# Patient Record
Sex: FEMALE | Race: WHITE | Employment: FULL TIME | URBAN - METROPOLITAN AREA
[De-identification: names, ages, dates, MRNs, and addresses within clinical notes are randomized per-mention and may not be internally consistent; named-entity substitution may affect disease eponyms.]

---

## 2017-03-13 NOTE — PROGRESS NOTES
HPI:    Patient ID: Edgardo Wiseman is a 39year old female. Patient presents with: Follow - Up: anxiety    HPI   Here for anxiety sx. Started zoloft 4 weeks and noticing improvement. Was seen in 8/2016 but did not start med due to breastfeeding.   Daug N, DO;  Location:  [facility]      Family History   Problem Relation Age of Onset   • Cancer Father      skin cancer   • Heart Disorder Father 58   • Hypertension Father    • Diabetes Neg    • Psychiatric Sister    • Psychiatric Brother       Social History:

## 2017-08-26 NOTE — PROGRESS NOTES
HPI:    Patient ID: Bryan Blanco is a 39year old female. Patient presents with:  Abdominal Pain    HPI  Left lower abdominal pain  Onset: 4-5 months. Intermittent. Freq: 1-2 times a week. Sharp pains. Resolves on own after few minutes.   Non-radiatin Disorder Father 58   • Hypertension Father    • Psychiatric Sister    • Psychiatric Brother    • Diabetes Neg       Social History: Smoking status: Never Smoker                                                              Alcohol use:  No                 PH

## 2017-10-04 ENCOUNTER — TELEPHONE (OUTPATIENT)
Dept: FAMILY MEDICINE CLINIC | Facility: CLINIC | Age: 37
End: 2017-10-04

## 2017-10-04 RX ORDER — ALPRAZOLAM 0.25 MG/1
0.25 TABLET ORAL DAILY PRN
Qty: 10 TABLET | Refills: 0 | OUTPATIENT
Start: 2017-10-04 | End: 2018-12-21
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
## 2017-10-04 NOTE — TELEPHONE ENCOUNTER
Pt states she has been on sertraline for about 4-7 months. She says it was working well and she was not getting panic attacks often but recently she has been working 12-13 hour days and her work environment has been more stressful.  She states attacks last

## 2017-10-04 NOTE — TELEPHONE ENCOUNTER
Increased anxiety with new job past few weeks. Plan: trial of xanax prn. Cont zoloft. Side effects and therapeutic benefits of medication reviewed. Can't take it before driving or if needs to be alert.  Pt agrees and expresses understanding  Refer to Gail Oconnor

## 2017-10-06 ENCOUNTER — TELEPHONE (OUTPATIENT)
Dept: FAMILY MEDICINE CLINIC | Facility: CLINIC | Age: 37
End: 2017-10-06

## 2017-10-06 NOTE — TELEPHONE ENCOUNTER
Pt called stated she has questions regarding her anxiety she stated that Dr Shante Mohr prescribed her Xanax and she just does not feel comfortable taking it especially when she is at work. So she is not quite sure what to do.

## 2017-10-06 NOTE — TELEPHONE ENCOUNTER
Spoke with pt. She is feeling better now. Her new job that she started approx 4 weeks ago is causing panic attacks. Feels overwhelmed when there. Offered to increase zoloft dose but she would like to hold of on that for now.   Plan:   Xanax prn  Will be

## 2017-10-06 NOTE — TELEPHONE ENCOUNTER
Pt has concerns taking the medication (xanax)  Pt has never taken this medication before. Patient is concerned this medication is addicting. Patient is worried about the side effects - taking it at work. Patient has been having panic attacks at work.   P

## 2017-12-28 ENCOUNTER — TELEPHONE (OUTPATIENT)
Dept: FAMILY MEDICINE CLINIC | Facility: CLINIC | Age: 37
End: 2017-12-28

## 2017-12-28 NOTE — TELEPHONE ENCOUNTER
Pt called stated she has an infection in her jaw bone from her wisdom teeth coming out and her dentist does not really want to do anything for this except for a root canal. Pt stated she can't get into any where else until next week.  She was wondering if D

## 2017-12-28 NOTE — TELEPHONE ENCOUNTER
DEYVI Muñoz. Patient states she has infection from an old root canal.  Patient saw her dentist and he told her she has an infection in her jaw bone.   Patient states the dentist told her he is not prescribing antibiotics at this time  Patient is seeing an

## 2017-12-30 ENCOUNTER — OFFICE VISIT (OUTPATIENT)
Dept: FAMILY MEDICINE CLINIC | Facility: CLINIC | Age: 37
End: 2017-12-30

## 2017-12-30 VITALS
DIASTOLIC BLOOD PRESSURE: 58 MMHG | HEART RATE: 87 BPM | SYSTOLIC BLOOD PRESSURE: 98 MMHG | TEMPERATURE: 99 F | RESPIRATION RATE: 20 BRPM | OXYGEN SATURATION: 98 %

## 2017-12-30 DIAGNOSIS — J02.0 STREP THROAT: Primary | ICD-10-CM

## 2017-12-30 PROCEDURE — 87880 STREP A ASSAY W/OPTIC: CPT | Performed by: NURSE PRACTITIONER

## 2017-12-30 PROCEDURE — 99213 OFFICE O/P EST LOW 20 MIN: CPT | Performed by: NURSE PRACTITIONER

## 2017-12-30 RX ORDER — AMOXICILLIN 875 MG/1
875 TABLET, COATED ORAL 2 TIMES DAILY
Qty: 20 TABLET | Refills: 0 | Status: SHIPPED | OUTPATIENT
Start: 2017-12-30 | End: 2018-01-09

## 2017-12-30 NOTE — PROGRESS NOTES
CHIEF COMPLAINT:   Patient presents with:  Sore Throat      HPI:   Uriah Ricks is a 40year old female presents to clinic with symptoms of sore throat. Patient has had for 3 days. Symptoms have been constant since onset.   Patient reports following asso LUNGS: clear to auscultation bilaterally, no wheezes or rhonchi. Breathing is non labored.   CARDIO: RRR without murmur  GI: good BS's,no masses, hepatosplenomegaly, or tenderness on direct palpation  EXTREMITIES: no cyanosis, clubbing or edema  LYMPH: no a · No work or school for the first 2 days of taking the antibiotics. After this time, you will not be contagious. You can then return to school or work if you are feeling better.   · Take antibiotic medicine for the full 10 days, even if you feel better.  Sera Buchanan © 4178-1521 [employer] [address] [facility], [facility] [place]. All rights reserved. This information is not intended as a substitute for professional medical care. Always follow your healthcare professional's instructions.               Madeleine Horne

## 2018-03-13 ENCOUNTER — OFFICE VISIT (OUTPATIENT)
Dept: FAMILY MEDICINE CLINIC | Facility: CLINIC | Age: 38
End: 2018-03-13

## 2018-03-13 VITALS
SYSTOLIC BLOOD PRESSURE: 116 MMHG | HEIGHT: 67 IN | TEMPERATURE: 98 F | RESPIRATION RATE: 18 BRPM | DIASTOLIC BLOOD PRESSURE: 78 MMHG | BODY MASS INDEX: 25.43 KG/M2 | OXYGEN SATURATION: 98 % | HEART RATE: 71 BPM | WEIGHT: 162 LBS

## 2018-03-13 DIAGNOSIS — I88.9 LYMPHADENITIS: Primary | ICD-10-CM

## 2018-03-13 DIAGNOSIS — J02.9 SORE THROAT: ICD-10-CM

## 2018-03-13 LAB — CONTROL LINE PRESENT WITH A CLEAR BACKGROUND (YES/NO): YES YES/NO

## 2018-03-13 PROCEDURE — 99213 OFFICE O/P EST LOW 20 MIN: CPT | Performed by: PHYSICIAN ASSISTANT

## 2018-03-13 PROCEDURE — 87880 STREP A ASSAY W/OPTIC: CPT | Performed by: PHYSICIAN ASSISTANT

## 2018-03-13 RX ORDER — CEPHALEXIN 500 MG/1
500 CAPSULE ORAL 3 TIMES DAILY
Qty: 21 CAPSULE | Refills: 0 | Status: SHIPPED | OUTPATIENT
Start: 2018-03-13 | End: 2018-03-20

## 2018-03-14 NOTE — PROGRESS NOTES
CHIEF COMPLAINT:   Patient presents with:  Sore Throat: only on left side x 4 days        HPI:   Ana Urena is a 40year old female presents to clinic with complaint of sore throat. Patient has had 4 days. Symptoms have been stable since onset.   Uri Turk /78 (BP Location: Left arm, Patient Position: Sitting, Cuff Size: adult)   Pulse 71   Temp 98.1 °F (36.7 °C) (Oral)   Resp 18   Ht 67\"   Wt 162 lb   LMP 02/28/2018   SpO2 98%   BMI 25.37 kg/m²   GENERAL: well developed, well nourished,in no apparent Follow up with PCP in 3-5 days if not improving, condition worsens, or fever greater than or equal to 100.4 persists for 72 hours. The patient/parent indicates understanding of these issues and agrees to the plan. Patient Instructions     1.   Keflex · You may use acetaminophen or ibuprofen to control pain and fever, unless another medicine was prescribed for this. Don’t use ibuprofen in children under 10months of age.  If you have chronic liver or kidney disease, talk with your healthcare provider befo · Ask your child’s healthcare provider how you should take the temperature.   · Rectal or forehead (temporal artery) temperature of 100.4°F (38°C) or higher, or as directed by the provider  · Armpit temperature of 99°F (37.2°C) or higher, or as directed by

## 2018-03-14 NOTE — PATIENT INSTRUCTIONS
1.  Keflex 500 mg three times daily for 7 days. 2.  Soft diet, ibuprofen as directed for pain/inflammation. Local Lymph Node Infection, Antibiotic Treatment    You have a bacterial infection of a lymph node.  The lymph nodes are part of your immune · You may use acetaminophen or ibuprofen to control pain and fever, unless another medicine was prescribed for this. Don’t use ibuprofen in children under 10months of age.  If you have chronic liver or kidney disease, talk with your healthcare provider befo · Ask your child’s healthcare provider how you should take the temperature.   · Rectal or forehead (temporal artery) temperature of 100.4°F (38°C) or higher, or as directed by the provider  · Armpit temperature of 99°F (37.2°C) or higher, or as directed by

## 2018-04-04 NOTE — TELEPHONE ENCOUNTER
Sertraline HCl 50 MG Oral Tab 90 tablet 1 8/26/2017     Last labs 08/26/16. Last seen on 08/26/17. CC: abd pain   BP 90/62. No future appointments. Thank you.

## 2018-05-16 ENCOUNTER — TELEPHONE (OUTPATIENT)
Dept: FAMILY MEDICINE CLINIC | Facility: CLINIC | Age: 38
End: 2018-05-16

## 2018-05-16 DIAGNOSIS — C44.91 SKIN CANCER, BASAL CELL: Primary | ICD-10-CM

## 2018-05-16 NOTE — TELEPHONE ENCOUNTER
LMTCB    Notify pt. Referral has been place.   Joselyn Goodell MD       Address: [address] [facility] [id number]     Phone: [phone]

## 2018-05-16 NOTE — TELEPHONE ENCOUNTER
Please advise if she needs a referral to see dermatologist or she needs an apt to see you? Please advise.

## 2018-05-16 NOTE — TELEPHONE ENCOUNTER
Pt called stated she has a history of basal cells and was wondering if she could get a referral to a Dermatology.  She stated she has new spots and was wondering if  wants to see her first.

## 2018-07-03 NOTE — TELEPHONE ENCOUNTER
LOV: 8/26/17 anxiety    SERTRALINE HCL 50 MG Oral Tab 90 tablet 0 4/4/2018    Sig :  TAKE 1 TABLET(50 MG) BY MOUTH DAILY     Route:   (none)       No future appointments. Please advise.

## 2018-07-08 ENCOUNTER — HOSPITAL ENCOUNTER (OUTPATIENT)
Age: 38
Discharge: HOME | End: 2018-07-08
Attending: EMERGENCY MEDICINE
Payer: COMMERCIAL

## 2018-07-08 ENCOUNTER — APPOINTMENT (OUTPATIENT)
Dept: CT IMAGING | Age: 38
End: 2018-07-08
Attending: EMERGENCY MEDICINE
Payer: COMMERCIAL

## 2018-07-08 VITALS
DIASTOLIC BLOOD PRESSURE: 82 MMHG | HEIGHT: 67 IN | WEIGHT: 170 LBS | HEART RATE: 80 BPM | RESPIRATION RATE: 16 BRPM | SYSTOLIC BLOOD PRESSURE: 119 MMHG | TEMPERATURE: 99 F | BODY MASS INDEX: 26.68 KG/M2 | OXYGEN SATURATION: 98 %

## 2018-07-08 DIAGNOSIS — N83.201 CYST OF RIGHT OVARY: Primary | ICD-10-CM

## 2018-07-08 DIAGNOSIS — K59.00 CONSTIPATION, UNSPECIFIED CONSTIPATION TYPE: ICD-10-CM

## 2018-07-08 LAB
#LYMPHOCYTE IC: 2.4 X10ˆ3/UL (ref 0.9–3.2)
#MXD IC: 0.5 X10ˆ3/UL (ref 0.1–1)
#NEUTROPHIL IC: 3.7 X10ˆ3/UL (ref 1.3–6.7)
ALBUMIN SERPL-MCNC: 3.8 G/DL (ref 3.5–4.8)
ALP LIVER SERPL-CCNC: 47 U/L (ref 37–98)
ALT SERPL-CCNC: 24 U/L (ref 14–54)
AST SERPL-CCNC: 11 U/L (ref 15–41)
BILIRUB SERPL-MCNC: 0.3 MG/DL (ref 0.1–2)
BUN BLD-MCNC: 18 MG/DL (ref 8–20)
CALCIUM BLD-MCNC: 9 MG/DL (ref 8.3–10.3)
CHLORIDE: 107 MMOL/L (ref 101–111)
CO2: 25 MMOL/L (ref 22–32)
CREAT BLD-MCNC: 0.84 MG/DL (ref 0.55–1.02)
CREAT SERPL-MCNC: 0.8 MG/DL (ref 0.55–1.02)
GLUCOSE BLD-MCNC: 96 MG/DL (ref 70–99)
GLUCOSE BLD-MCNC: 99 MG/DL (ref 70–99)
HCT IC: 35.1 % (ref 37–54)
HGB IC: 11.6 G/DL (ref 12–16)
ISTAT BLOOD GAS TCO2: 25 MMOL/L (ref 22–32)
ISTAT BUN: 18 MG/DL (ref 8–20)
ISTAT CHLORIDE: 104 MMOL/L (ref 101–111)
ISTAT HEMATOCRIT: 35 % (ref 34–50)
ISTAT IONIZED CALCIUM: 1.3 MMOL/L (ref 1.12–1.32)
ISTAT POTASSIUM: 4.1 MMOL/L (ref 3.6–5.1)
ISTAT SODIUM: 140 MMOL/L (ref 136–144)
LYMPHOCYTES NFR BLD AUTO: 35.9 %
M PROTEIN MFR SERPL ELPH: 7.5 G/DL (ref 6.1–8.3)
MCH IC: 29.4 PG (ref 27–33.2)
MCHC IC: 33 G/DL (ref 31–37)
MCV IC: 88.9 FL (ref 81–100)
MIXED CELL %: 7.6 %
NEUTROPHILS NFR BLD AUTO: 56.5 %
PLT IC: 319 X10ˆ3/UL (ref 150–450)
POCT BILIRUBIN URINE: NEGATIVE
POCT BLOOD URINE: NEGATIVE
POCT GLUCOSE URINE: NEGATIVE MG/DL
POCT LEUKOCYTE ESTERASE URINE: NEGATIVE
POCT NITRITE URINE: NEGATIVE
POCT PH URINE: 6 (ref 5–8)
POCT SPECIFIC GRAVITY URINE: 1.03
POCT URINE CLARITY: CLEAR
POCT URINE COLOR: YELLOW
POCT URINE PREGNANCY: NEGATIVE
POCT UROBILINOGEN URINE: 0.2 MG/DL
POTASSIUM SERPL-SCNC: 4.1 MMOL/L (ref 3.6–5.1)
RBC IC: 3.95 X10ˆ6/UL (ref 3.8–5.1)
SODIUM SERPL-SCNC: 141 MMOL/L (ref 136–144)
WBC IC: 6.6 X10ˆ3/UL (ref 4–13)

## 2018-07-08 PROCEDURE — 81002 URINALYSIS NONAUTO W/O SCOPE: CPT | Performed by: EMERGENCY MEDICINE

## 2018-07-08 PROCEDURE — 99204 OFFICE O/P NEW MOD 45 MIN: CPT

## 2018-07-08 PROCEDURE — 81025 URINE PREGNANCY TEST: CPT | Performed by: EMERGENCY MEDICINE

## 2018-07-08 PROCEDURE — 80047 BASIC METABLC PNL IONIZED CA: CPT

## 2018-07-08 PROCEDURE — 74177 CT ABD & PELVIS W/CONTRAST: CPT | Performed by: EMERGENCY MEDICINE

## 2018-07-08 PROCEDURE — 99214 OFFICE O/P EST MOD 30 MIN: CPT

## 2018-07-08 PROCEDURE — 36415 COLL VENOUS BLD VENIPUNCTURE: CPT

## 2018-07-08 PROCEDURE — 80053 COMPREHEN METABOLIC PANEL: CPT | Performed by: EMERGENCY MEDICINE

## 2018-07-08 PROCEDURE — 85025 COMPLETE CBC W/AUTO DIFF WBC: CPT | Performed by: EMERGENCY MEDICINE

## 2018-07-08 NOTE — ED PROVIDER NOTES
Patient presents with:  Abdominal Pain  Back Pain (musculoskeletal)    HPI:     Margarita Dallas is a 40year old female who presents with chief complaint of abdominal pain and back pain. Hx of R dermoid cyst being followed by gyn with serial US q3m.   Harlene Prader within normal limits   POCT CBC - Abnormal; Notable for the following:     HGB IC 11.6 (*)     HCT IC 35.1 (*)     All other components within normal limits   POCT PREGNANCY, URINE - Normal   POCT ISTAT CHEM8 CARTRIDGE - Normal   COMP METABOLIC PANEL (14) ORGANS:  There is at a 5.0 x 4.0 cm right ovarian cyst.  Sonographic followup is recommended. Trace amount of adjacent free fluid BONES:  Mild degenerative changes in the lower lumbar facets.        CONCLUSION:  5 cm right ovarian cyst with trace amount of

## 2018-07-08 NOTE — ED INITIAL ASSESSMENT (HPI)
Pt c/o right lower abd pain since yesterday. Pt has hx of dermoid that they monitor. States she feels like it is her one remaining ovary causing her pain. Patient now having left lower back pain as well.  Noticed she is urinating more than usual. Denies unu

## 2018-07-09 NOTE — ED NOTES
Left message to call for results.   sociated Results     COMP METABOLIC PANEL (14)   Order: [id number]   Status:  Final result   Visible to patient:  No (Not Released)   Notes recorded by Edmar Richardson MD on 7/9/2018 at 11:29 AM CDT  Noted - normal.

## 2018-07-17 ENCOUNTER — TELEPHONE (OUTPATIENT)
Dept: FAMILY MEDICINE CLINIC | Facility: CLINIC | Age: 38
End: 2018-07-17

## 2018-07-17 NOTE — TELEPHONE ENCOUNTER
Patient seen in [address] [place] [address] 7/8/18  Patient has pain on left lower abdominal pain - patient does not have an ovary on that side. Pain goes to back left lower. Patient is not sure what is causing all the pain.   CT done in IC - cyst found  Patient is seeing OB/GYNE

## 2018-07-17 NOTE — TELEPHONE ENCOUNTER
Seen in IC last weekend. Did CAT SCAN for right side pain and lower back. Inconclusive. Would like to schedule F/U as having sharp pains on Left side. No open appts.

## 2018-07-18 NOTE — TELEPHONE ENCOUNTER
Rec f/u with OB as scheduled. Sched appt with me next week.   There is an opening at 10:40 on Friday, 7/27

## 2018-08-15 PROCEDURE — 83036 HEMOGLOBIN GLYCOSYLATED A1C: CPT | Performed by: FAMILY MEDICINE

## 2018-08-15 PROCEDURE — 80053 COMPREHEN METABOLIC PANEL: CPT | Performed by: FAMILY MEDICINE

## 2018-08-15 PROCEDURE — 84443 ASSAY THYROID STIM HORMONE: CPT | Performed by: FAMILY MEDICINE

## 2018-08-15 PROCEDURE — 80061 LIPID PANEL: CPT | Performed by: FAMILY MEDICINE

## 2018-08-15 PROCEDURE — 85025 COMPLETE CBC W/AUTO DIFF WBC: CPT | Performed by: FAMILY MEDICINE

## 2018-08-15 NOTE — PROGRESS NOTES
HPI:    Patient ID: Adamaris Hudson is a 40year old female. Patient presents with:  Medication Follow-Up: sertraline, alprazolam    HPI  Here to follow up on anxiety. Xanax use is rare. Doing well on current dose of zoloft.   Denies feeling tearful , moo Never Smoker                                                              Smokeless tobacco: Never Used                      Alcohol use: No                 PHYSICAL EXAM:    Physical Exam   Constitutional: She appears well-nourished. No distress.    HENT:

## 2018-08-20 ENCOUNTER — TELEPHONE (OUTPATIENT)
Dept: FAMILY MEDICINE CLINIC | Facility: CLINIC | Age: 38
End: 2018-08-20

## 2018-08-20 NOTE — TELEPHONE ENCOUNTER
Recent cbc shows Hb of 11.9. Borderline low but should not be causing any sx. Schedule OV this week or go to  tonight.

## 2018-08-20 NOTE — TELEPHONE ENCOUNTER
Stated since Aug 15 has felt upset stomach and dizzy and not able to eat. Does not feel sick. Got sick to her stomach last Thursday. Has period. Wants to discuss.

## 2018-08-20 NOTE — TELEPHONE ENCOUNTER
Call from patient. Stated that since Aug 15, she has been getting really nauseous and dizziness. Nausea is constant. Dizziness comes and go. Kind of throughout day and random. Tries to eat snacks-doesn't help. Drinking water.  States she left work the other

## 2018-08-21 NOTE — TELEPHONE ENCOUNTER
Patient is feeling nausea and dizziness.     Patient scheduled for appointment with Dr. Pinky Kwong.   08/22/2018 12 pm       .

## 2018-08-22 ENCOUNTER — OFFICE VISIT (OUTPATIENT)
Dept: FAMILY MEDICINE CLINIC | Facility: CLINIC | Age: 38
End: 2018-08-22

## 2018-08-22 VITALS
OXYGEN SATURATION: 99 % | WEIGHT: 177 LBS | SYSTOLIC BLOOD PRESSURE: 100 MMHG | HEIGHT: 67 IN | BODY MASS INDEX: 27.78 KG/M2 | TEMPERATURE: 98 F | HEART RATE: 76 BPM | DIASTOLIC BLOOD PRESSURE: 64 MMHG | RESPIRATION RATE: 12 BRPM

## 2018-08-22 DIAGNOSIS — R42 VERTIGO: Primary | ICD-10-CM

## 2018-08-22 PROCEDURE — 99214 OFFICE O/P EST MOD 30 MIN: CPT | Performed by: FAMILY MEDICINE

## 2018-08-22 RX ORDER — MECLIZINE HYDROCHLORIDE 25 MG/1
25 TABLET ORAL 3 TIMES DAILY PRN
Qty: 20 TABLET | Refills: 0 | Status: SHIPPED | OUTPATIENT
Start: 2018-08-22 | End: 2019-02-20

## 2018-08-22 NOTE — PROGRESS NOTES
HPI:    Patient ID: Leslee Macias is a 40year old female. Patient presents with:  Nausea: nausea, dizzy    HPI  Started feeling dizzy past 1 week. Describes as vertigo. C/O room spinning and off balance.   Triggered by: bending or turning  No recent UR • Diabetes Neg       Social History: Smoking status: Never Smoker                                                              Smokeless tobacco: Never Used                      Alcohol use:  No                 PHYSICAL EXAM:    Physical Exam   [facility]

## 2018-09-17 ENCOUNTER — OFFICE VISIT (OUTPATIENT)
Dept: OBGYN CLINIC | Facility: CLINIC | Age: 38
End: 2018-09-17

## 2018-09-17 VITALS
SYSTOLIC BLOOD PRESSURE: 130 MMHG | HEIGHT: 67 IN | BODY MASS INDEX: 28.09 KG/M2 | WEIGHT: 179 LBS | DIASTOLIC BLOOD PRESSURE: 78 MMHG

## 2018-09-17 DIAGNOSIS — N83.201 CYST OF RIGHT OVARY: Primary | ICD-10-CM

## 2018-09-17 PROCEDURE — 99203 OFFICE O/P NEW LOW 30 MIN: CPT | Performed by: OBSTETRICS & GYNECOLOGY

## 2018-09-17 NOTE — PROGRESS NOTES
Shabbir Marcus is a 40year old female. HPI:   Patient presents with: Other: PT here to discuss CT and HX of dermoid cyst       Pt states hx of dermoid cysts in the past. Had lt oophorectomy for one.   Has had a \" small \" one on the right for some ti Allergies      ROS:   No complaints, no pain    PHYSICAL EXAM:   . ..deferred  ASSESSMENT/PLAN:   Assessment     1. Cyst of right ovary        D/w pt, only have ct to look at, unsure size or makeup of dermoid.  If indeed dermoid id tiny, she may be able to o

## 2018-09-18 ENCOUNTER — OFFICE VISIT (OUTPATIENT)
Dept: FAMILY MEDICINE CLINIC | Facility: CLINIC | Age: 38
End: 2018-09-18

## 2018-09-18 VITALS
HEART RATE: 81 BPM | DIASTOLIC BLOOD PRESSURE: 60 MMHG | RESPIRATION RATE: 18 BRPM | TEMPERATURE: 98 F | SYSTOLIC BLOOD PRESSURE: 112 MMHG | OXYGEN SATURATION: 98 %

## 2018-09-18 DIAGNOSIS — J06.9 VIRAL URI: ICD-10-CM

## 2018-09-18 DIAGNOSIS — J02.9 SORE THROAT: Primary | ICD-10-CM

## 2018-09-18 LAB — CONTROL LINE PRESENT WITH A CLEAR BACKGROUND (YES/NO): YES YES/NO

## 2018-09-18 PROCEDURE — 87081 CULTURE SCREEN ONLY: CPT | Performed by: NURSE PRACTITIONER

## 2018-09-18 PROCEDURE — 87880 STREP A ASSAY W/OPTIC: CPT | Performed by: NURSE PRACTITIONER

## 2018-09-18 PROCEDURE — 99213 OFFICE O/P EST LOW 20 MIN: CPT | Performed by: NURSE PRACTITIONER

## 2018-09-18 NOTE — PROGRESS NOTES
CHIEF COMPLAINT:   Patient presents with:  Sore Throat: x 6 days/ exposure to strep         HPI:   Rhina Bettencourt is a 40year old female presents to clinic with complaint of sore throat. Patient has had for 6 days.  Symptoms have been improving since on SKIN: no rashes,no suspicious lesions  HEAD: atraumatic, normocephalic  EYES: conjunctiva clear, EOM intact  EARS: TM's clear, non-injected, no bulging, retraction, or fluid bilaterally  NOSE: nostrils patent, clear nasal discharge, nasal mucosa red  THROA You have a viral upper respiratory illness (URI), which is another term for the common cold. This illness is contagious during the first few days. It is spread through the air by coughing and sneezing.  It may also be spread by direct contact (touching the · Cough with lots of colored sputum (mucus)  · Severe headache; face, neck, or ear pain  · Difficulty swallowing due to throat pain  · Fever of 100.4°F (38°C) or higher, or as directed by your healthcare provider  Call 911  Call 911 if any of these occur:

## 2018-12-21 NOTE — TELEPHONE ENCOUNTER
Refill on Xanax. Does not take daily, just took her last one from prior script today. Pharmacy is [facility] on [address].

## 2018-12-21 NOTE — TELEPHONE ENCOUNTER
LOV: 8/15/18 for anxiety and depression  Patient does not take daily - took last one today  Please advise is apt needed    ALPRAZolam 0.25 MG Oral Tab 10 tablet 0 10/4/2017    Sig :  Take 1 tablet (0.25 mg total) by mouth daily as needed for Sleep.      Rou

## 2018-12-24 RX ORDER — ALPRAZOLAM 0.25 MG/1
0.25 TABLET ORAL DAILY PRN
Qty: 10 TABLET | Refills: 0 | Status: SHIPPED | OUTPATIENT
Start: 2018-12-24 | End: 2020-01-03

## 2019-02-01 ENCOUNTER — NURSE ONLY (OUTPATIENT)
Dept: FAMILY MEDICINE CLINIC | Facility: CLINIC | Age: 39
End: 2019-02-01

## 2019-02-01 VITALS
RESPIRATION RATE: 18 BRPM | BODY MASS INDEX: 26.68 KG/M2 | SYSTOLIC BLOOD PRESSURE: 112 MMHG | OXYGEN SATURATION: 98 % | HEIGHT: 67 IN | TEMPERATURE: 98 F | HEART RATE: 105 BPM | WEIGHT: 170 LBS | DIASTOLIC BLOOD PRESSURE: 70 MMHG

## 2019-02-01 DIAGNOSIS — J02.0 STREP PHARYNGITIS: ICD-10-CM

## 2019-02-01 DIAGNOSIS — J02.9 SORE THROAT: Primary | ICD-10-CM

## 2019-02-01 LAB
CONTROL LINE PRESENT WITH A CLEAR BACKGROUND (YES/NO): YES YES/NO
STREP GRP A CUL-SCR: POSITIVE

## 2019-02-01 PROCEDURE — 99213 OFFICE O/P EST LOW 20 MIN: CPT | Performed by: FAMILY MEDICINE

## 2019-02-01 PROCEDURE — 87880 STREP A ASSAY W/OPTIC: CPT | Performed by: FAMILY MEDICINE

## 2019-02-01 RX ORDER — PENICILLIN V POTASSIUM 500 MG/1
500 TABLET ORAL 2 TIMES DAILY
Qty: 20 TABLET | Refills: 0 | Status: SHIPPED | OUTPATIENT
Start: 2019-02-01 | End: 2019-02-11

## 2019-02-01 NOTE — PROGRESS NOTES
CHIEF COMPLAINT:   Patient presents with:  Sore Throat: x 1 day       HPI:   Uriah Ricks is a 45year old female who presents to clinic with symptoms of sore throat. Patient has had for 1 days. Symptoms have been worse since onset.  No fever, cough or No        REVIEW OF SYSTEMS:   GENERAL HEALTH: See HPI  SKIN: See HPI  HEENT: See HPI  RESPIRATORY: denies shortness of breath, or wheezing  CARDIOVASCULAR: denies chest pain, palpitations   GI: denies abdominal pain, constipation and diarrhea  NEURO: sammy Penicillin VK. Patient is advised to do salt water gargles to help soothe the throat. Also recommended to take OTC Tylenol or Motrin as needed for throat pain. PLAN: Meds as below.   Comfort care instructions as listed in Patient Instructions    Meds &

## 2019-02-01 NOTE — PATIENT INSTRUCTIONS
Pharyngitis: Strep (Confirmed)    You have had a positive test for strep throat. Strep throat is a contagious illness. It is spread by coughing, kissing or by touching others after touching your mouth or nose.  Symptoms include throat pain that is worse · Lymph nodes getting larger or becoming soft in the middle  · You can't swallow liquids or you can't open your mouth wide because of throat pain  · Signs of dehydration. These include very dark urine or no urine, sunken eyes, and dizziness.   · Trouble sugey

## 2019-02-14 ENCOUNTER — HOSPITAL ENCOUNTER (EMERGENCY)
Facility: HOSPITAL | Age: 39
Discharge: HOME | End: 2019-02-15
Payer: COMMERCIAL

## 2019-02-14 ENCOUNTER — APPOINTMENT (OUTPATIENT)
Dept: CT IMAGING | Facility: HOSPITAL | Age: 39
End: 2019-02-14
Payer: COMMERCIAL

## 2019-02-14 ENCOUNTER — NURSE ONLY (OUTPATIENT)
Dept: FAMILY MEDICINE CLINIC | Facility: CLINIC | Age: 39
End: 2019-02-14

## 2019-02-14 VITALS
HEIGHT: 67 IN | TEMPERATURE: 98 F | HEART RATE: 80 BPM | BODY MASS INDEX: 25.9 KG/M2 | OXYGEN SATURATION: 97 % | RESPIRATION RATE: 16 BRPM | DIASTOLIC BLOOD PRESSURE: 93 MMHG | SYSTOLIC BLOOD PRESSURE: 145 MMHG | WEIGHT: 165 LBS

## 2019-02-14 VITALS
TEMPERATURE: 99 F | SYSTOLIC BLOOD PRESSURE: 114 MMHG | DIASTOLIC BLOOD PRESSURE: 70 MMHG | HEART RATE: 88 BPM | OXYGEN SATURATION: 98 % | RESPIRATION RATE: 18 BRPM
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
## 2019-02-14 DIAGNOSIS — S06.0X0A CONCUSSION WITHOUT LOSS OF CONSCIOUSNESS, INITIAL ENCOUNTER: Primary | ICD-10-CM

## 2019-02-14 DIAGNOSIS — J03.01 ACUTE RECURRENT STREPTOCOCCAL TONSILLITIS: Primary | ICD-10-CM

## 2019-02-14 DIAGNOSIS — J02.9 SORE THROAT: ICD-10-CM

## 2019-02-14 DIAGNOSIS — S16.1XXA STRAIN OF NECK MUSCLE, INITIAL ENCOUNTER: ICD-10-CM

## 2019-02-14 LAB
CONTROL LINE PRESENT WITH A CLEAR BACKGROUND (YES/NO): YES YES/NO
STREP GRP A CUL-SCR: POSITIVE

## 2019-02-14 PROCEDURE — 99284 EMERGENCY DEPT VISIT MOD MDM: CPT

## 2019-02-14 PROCEDURE — 87880 STREP A ASSAY W/OPTIC: CPT | Performed by: PHYSICIAN ASSISTANT

## 2019-02-14 PROCEDURE — 99213 OFFICE O/P EST LOW 20 MIN: CPT | Performed by: PHYSICIAN ASSISTANT

## 2019-02-14 PROCEDURE — 72125 CT NECK SPINE W/O DYE: CPT

## 2019-02-14 PROCEDURE — 70450 CT HEAD/BRAIN W/O DYE: CPT

## 2019-02-14 RX ORDER — CEPHALEXIN 500 MG/1
500 CAPSULE ORAL 2 TIMES DAILY
Qty: 20 CAPSULE | Refills: 0 | Status: SHIPPED | OUTPATIENT
Start: 2019-02-14 | End: 2019-02-20

## 2019-02-14 RX ORDER — CYCLOBENZAPRINE HCL 10 MG
10 TABLET ORAL 3 TIMES DAILY PRN
Qty: 10 TABLET | Refills: 0 | Status: SHIPPED | OUTPATIENT
Start: 2019-02-14 | End: 2019-06-05

## 2019-02-14 RX ORDER — IBUPROFEN 600 MG/1
600 TABLET ORAL EVERY 8 HOURS PRN
Qty: 10 TABLET | Refills: 0 | Status: SHIPPED | OUTPATIENT
Start: 2019-02-14 | End: 2019-06-05

## 2019-02-14 RX ORDER — IBUPROFEN 600 MG/1
600 TABLET ORAL ONCE
Status: COMPLETED | OUTPATIENT
Start: 2019-02-14 | End: 2019-02-14

## 2019-02-14 NOTE — PROGRESS NOTES
CHIEF COMPLAINT:   Patient presents with:  Sore Throat: seen recently /not feeling better       HPI:   Brandy Taylor is a 45year old female presents to clinic with symptoms of sore throat. Patient has had for 1 weeks.  Symptoms have been stable since ons Patient Position: Sitting, Cuff Size: adult)   Pulse 88   Temp 98.5 °F (36.9 °C) (Tympanic)   Resp 18   LMP 02/11/2019   SpO2 98%   GENERAL: well developed, well nourished,in no apparent distress  SKIN: no rashes,no suspicious lesions  HEAD: atraumatic, no not share utensils or drinks with anyone. Follow up with PCP if not improving, condition worsens, or fever greater than or equal to 100.4 persists for 72 hours. The patient/parent indicates understanding of these issues and agrees to the plan.   Anthony White aspirating the small lozenge. Gargling with salt water is also sometimes helpful; people may try mixing table salt (about 1 to 2 teaspoons) with warm water (about 8 oz) and gargling. · Avoid tobacco products and alcohol.     · Do not share utensils or dri

## 2019-02-14 NOTE — PATIENT INSTRUCTIONS
1.  Keflex 500 mg twice daily for 10 days. Recommend probiotics while on this medication to prevent antibiotics associated diarrhea or yeast infections.   Examples include yogurt with active live cultures; or in capsule/granule forms such as Florastor, Cul improving, condition worsens, or fever greater than or equal to 100.4 persists for 72 hours.     · Be sure to finish entire course of antibiotics to reduce risk of reinfection or antibiotic resistance

## 2019-02-15 ENCOUNTER — TELEPHONE (OUTPATIENT)
Dept: FAMILY MEDICINE CLINIC | Facility: CLINIC | Age: 39
End: 2019-02-15
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
## 2019-02-16 ENCOUNTER — TELEPHONE (OUTPATIENT)
Dept: FAMILY MEDICINE CLINIC | Facility: CLINIC | Age: 39
End: 2019-02-16

## 2019-02-16 ENCOUNTER — HOSPITAL ENCOUNTER (OUTPATIENT)
Age: 39
Discharge: HOME | End: 2019-02-16
Attending: EMERGENCY MEDICINE
Payer: COMMERCIAL

## 2019-02-16 VITALS
WEIGHT: 165 LBS | HEART RATE: 80 BPM | BODY MASS INDEX: 25.9 KG/M2 | RESPIRATION RATE: 16 BRPM | OXYGEN SATURATION: 100 % | SYSTOLIC BLOOD PRESSURE: 111 MMHG | HEIGHT: 67 IN | TEMPERATURE: 98 F | DIASTOLIC BLOOD PRESSURE: 78 MMHG

## 2019-02-16 DIAGNOSIS — S16.1XXA STRAIN OF NECK MUSCLE, INITIAL ENCOUNTER: ICD-10-CM

## 2019-02-16 DIAGNOSIS — S06.0X0D CONCUSSION WITHOUT LOSS OF CONSCIOUSNESS, SUBSEQUENT ENCOUNTER: Primary | ICD-10-CM

## 2019-02-16 PROCEDURE — 99212 OFFICE O/P EST SF 10 MIN: CPT

## 2019-02-16 PROCEDURE — 99213 OFFICE O/P EST LOW 20 MIN: CPT
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
## 2019-02-20 ENCOUNTER — OFFICE VISIT (OUTPATIENT)
Dept: FAMILY MEDICINE CLINIC | Facility: CLINIC | Age: 39
End: 2019-02-20

## 2019-02-20 ENCOUNTER — TELEPHONE (OUTPATIENT)
Dept: FAMILY MEDICINE CLINIC | Facility: CLINIC | Age: 39
End: 2019-02-20

## 2019-02-20 VITALS
SYSTOLIC BLOOD PRESSURE: 118 MMHG | DIASTOLIC BLOOD PRESSURE: 80 MMHG | HEART RATE: 89 BPM | HEIGHT: 67 IN | BODY MASS INDEX: 28.88 KG/M2 | RESPIRATION RATE: 20 BRPM | WEIGHT: 184 LBS | TEMPERATURE: 98 F | OXYGEN SATURATION: 98 %

## 2019-02-20 DIAGNOSIS — S06.0X0D CONCUSSION WITHOUT LOSS OF CONSCIOUSNESS, SUBSEQUENT ENCOUNTER: Primary | ICD-10-CM

## 2019-02-20 PROCEDURE — 99213 OFFICE O/P EST LOW 20 MIN: CPT | Performed by: FAMILY MEDICINE
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
## 2019-02-20 NOTE — TELEPHONE ENCOUNTER
Pt was seen today,  Pt states the Work note she gave to her Employer needs to state the Diagnosis or Symptoms she was seen for. Please fax to [phone].

## 2019-02-22 NOTE — TELEPHONE ENCOUNTER
Pt stated employer told her the dates in the letter were wrong and are supposed to be 2/14 thru 2/24. Letter sent read 2 /10--2/24. Needs new letter faxed to:       [phone].

## 2019-02-25 ENCOUNTER — TELEPHONE (OUTPATIENT)
Dept: FAMILY MEDICINE CLINIC | Facility: CLINIC | Age: 39
End: 2019-02-25

## 2019-02-25 NOTE — TELEPHONE ENCOUNTER
Patient is at work currently  Patient states she feels when there is too much going on she starts to get dizzy, black spots in vision  Patient wakes up okay but then symptoms progress  Patient has a headache  Patient uses a computer and work and is on the

## 2019-02-25 NOTE — TELEPHONE ENCOUNTER
Drove 45 minutes to work today. When she gets overstimulated she feels out of it and dizzy and seeing black spots and headaches Notices this when she experiences too much going on. Wants to know what Dr. Pinky Kwong recommends. Prefers to work.   Was off all la

## 2019-03-13 ENCOUNTER — OFFICE VISIT (OUTPATIENT)
Dept: FAMILY MEDICINE CLINIC | Facility: CLINIC | Age: 39
End: 2019-03-13

## 2019-03-13 VITALS
DIASTOLIC BLOOD PRESSURE: 72 MMHG | TEMPERATURE: 97 F | HEART RATE: 78 BPM | BODY MASS INDEX: 28.41 KG/M2 | HEIGHT: 67 IN | RESPIRATION RATE: 20 BRPM | OXYGEN SATURATION: 98 % | SYSTOLIC BLOOD PRESSURE: 118 MMHG | WEIGHT: 181 LBS

## 2019-03-13 DIAGNOSIS — R51.9 HEADACHE DISORDER: ICD-10-CM

## 2019-03-13 DIAGNOSIS — S06.0X0D CONCUSSION WITHOUT LOSS OF CONSCIOUSNESS, SUBSEQUENT ENCOUNTER: Primary | ICD-10-CM

## 2019-03-13 PROCEDURE — 99213 OFFICE O/P EST LOW 20 MIN: CPT | Performed by: FAMILY MEDICINE

## 2019-03-13 NOTE — PROGRESS NOTES
HPI:    Patient ID: Ike Meade is a 45year old female. Patient presents with: Follow - Up: concussion    HPI   Here to follow up on concussion. Back to work and doing ok. Continues to have headaches but improved. Less freq and intense.  Gets HA on Father         skin cancer   • Heart Disorder Father 58   • Hypertension Father    • Psychiatric Sister    • Psychiatric Brother    • Diabetes Neg       Social History: Social History    Tobacco Use      Smoking status: Former Smoker      Smokeless tobacco

## 2019-04-03 ENCOUNTER — TELEPHONE (OUTPATIENT)
Dept: FAMILY MEDICINE CLINIC | Facility: CLINIC | Age: 39
End: 2019-04-03

## 2019-06-05 ENCOUNTER — OFFICE VISIT (OUTPATIENT)
Dept: FAMILY MEDICINE CLINIC | Facility: CLINIC | Age: 39
End: 2019-06-05

## 2019-06-05 VITALS
DIASTOLIC BLOOD PRESSURE: 60 MMHG | RESPIRATION RATE: 18 BRPM | TEMPERATURE: 98 F | HEART RATE: 79 BPM | WEIGHT: 181 LBS | OXYGEN SATURATION: 99 % | HEIGHT: 67 IN | BODY MASS INDEX: 28.41 KG/M2 | SYSTOLIC BLOOD PRESSURE: 92 MMHG

## 2019-06-05 DIAGNOSIS — Z00.00 ANNUAL PHYSICAL EXAM: Primary | ICD-10-CM

## 2019-06-05 DIAGNOSIS — Z79.899 MEDICATION MANAGEMENT: ICD-10-CM

## 2019-06-05 DIAGNOSIS — F32.A ANXIETY AND DEPRESSION: ICD-10-CM

## 2019-06-05 DIAGNOSIS — C44.91 SKIN CANCER, BASAL CELL: ICD-10-CM

## 2019-06-05 DIAGNOSIS — F41.9 ANXIETY AND DEPRESSION: ICD-10-CM

## 2019-06-05 PROCEDURE — 99395 PREV VISIT EST AGE 18-39: CPT | Performed by: FAMILY MEDICINE

## 2019-06-05 NOTE — PROGRESS NOTES
HPI:   Pedro Pablo Guardado is a 45year old female who presents for a complete physical exam.   No concerns today    Pap UTD  Mammogram: has order from gyne due to [facility] +breast cancer  Gyne: Dr. Ana Maria Norris    F/U anxiety and depression  Doing well on current dose SURGICAL HISTORY      left ovary removed for dermoid cyst   • OTHER SURGICAL HISTORY      b/l inguinal hernia      Family History   Problem Relation Age of Onset   • Cancer Father         skin cancer   • Heart Disorder Father 58   • Hypertension Father thought, behavior and judgement normal    ASSESSMENT AND PLAN:   Brandy Taylor is a 45year old female who presents for a complete physical exam.   Fasting BW ordered: Lipids, CMP, CBC., TSH and HbA1c. Anxiety/depression:controlled.  CPM  Pt' s weight is

## 2019-08-07 ENCOUNTER — TELEPHONE (OUTPATIENT)
Dept: FAMILY MEDICINE CLINIC | Facility: CLINIC | Age: 39
End: 2019-08-07

## 2019-09-12 ENCOUNTER — TELEPHONE (OUTPATIENT)
Dept: FAMILY MEDICINE CLINIC | Facility: CLINIC | Age: 39
End: 2019-09-12

## 2019-09-24 ENCOUNTER — TELEPHONE (OUTPATIENT)
Dept: FAMILY MEDICINE CLINIC | Facility: CLINIC | Age: 39
End: 2019-09-24

## 2019-09-24 NOTE — TELEPHONE ENCOUNTER
Phone call from patient. States been on 2 different abx from dentist.  Paulo Villarreal Saturday. Started today with stomach pains and diarrhea. Had colitis from abx in the past.  States having stomach pains all across abdomen. No bloody stools. No fever.   Julian Tyler

## 2019-09-24 NOTE — TELEPHONE ENCOUNTER
Pt prescribed two different antibiotics by dental office.   Finished meds Saturday now she has severe diarrhea   Thank Jaskaran Kaur

## 2019-09-25 ENCOUNTER — OFFICE VISIT (OUTPATIENT)
Dept: FAMILY MEDICINE CLINIC | Facility: CLINIC | Age: 39
End: 2019-09-25

## 2019-09-25 VITALS
RESPIRATION RATE: 16 BRPM | BODY MASS INDEX: 28.56 KG/M2 | HEIGHT: 67 IN | SYSTOLIC BLOOD PRESSURE: 100 MMHG | DIASTOLIC BLOOD PRESSURE: 62 MMHG | TEMPERATURE: 98 F | OXYGEN SATURATION: 98 % | WEIGHT: 182 LBS | HEART RATE: 92 BPM

## 2019-09-25 DIAGNOSIS — R19.5 LOOSE STOOLS: Primary | ICD-10-CM

## 2019-09-25 PROCEDURE — 99213 OFFICE O/P EST LOW 20 MIN: CPT | Performed by: FAMILY MEDICINE

## 2019-10-28 ENCOUNTER — TELEPHONE (OUTPATIENT)
Dept: FAMILY MEDICINE CLINIC | Facility: CLINIC | Age: 39
End: 2019-10-28

## 2019-10-28 NOTE — TELEPHONE ENCOUNTER
Spoke to pt.  Pt scheduled nurse appt for overdue fasting labs on 11/1/19 at 8:30 am.     Future Appointments   Date Time Provider Bernard Rodgers   11/1/2019  8:15 AM [facility] RACHAEL NURSE EMGMARIAW WHITNEY Rothman

## 2019-11-06 DIAGNOSIS — Z00.00 ANNUAL PHYSICAL EXAM: ICD-10-CM

## 2019-11-06 DIAGNOSIS — F32.A ANXIETY AND DEPRESSION: ICD-10-CM

## 2019-11-06 DIAGNOSIS — F41.9 ANXIETY AND DEPRESSION: ICD-10-CM

## 2019-11-06 DIAGNOSIS — Z79.899 MEDICATION MANAGEMENT: ICD-10-CM

## 2019-11-07 ENCOUNTER — TELEPHONE (OUTPATIENT)
Dept: FAMILY MEDICINE CLINIC | Facility: CLINIC | Age: 39
End: 2019-11-07

## 2019-11-07 NOTE — TELEPHONE ENCOUNTER
Pt called stated she was just dx with a concussion and wants her to f/u with Dr. Dimas Mcintyre. So she would like to f/u on Sat.

## 2019-11-07 NOTE — TELEPHONE ENCOUNTER
Call from patient. At work and a ladder hit her in the head. Went to ER in [place]. Has headache. Told her to f/u with primary. ER gave her a note to be off work until Monday. Made appt for tomorrow with DB.  Advised patient in the meantime if she is having

## 2019-11-16 ENCOUNTER — TELEPHONE (OUTPATIENT)
Dept: FAMILY MEDICINE CLINIC | Facility: CLINIC | Age: 39
End: 2019-11-16

## 2019-11-16 DIAGNOSIS — Z79.899 MEDICATION MANAGEMENT: ICD-10-CM

## 2019-11-16 DIAGNOSIS — F41.9 ANXIETY AND DEPRESSION: ICD-10-CM

## 2019-11-16 DIAGNOSIS — F32.A ANXIETY AND DEPRESSION: ICD-10-CM

## 2019-11-16 DIAGNOSIS — Z00.00 ANNUAL PHYSICAL EXAM: ICD-10-CM

## 2019-11-18 NOTE — TELEPHONE ENCOUNTER
Pt notified we sent this Rx in June for a 90d supply with refills for a year. Pt states the pharmacy told her they could not fill it for her when she called them this past weekend.   I called [facility] to see what is going on and they said they would get the [facility]

## 2019-11-18 NOTE — TELEPHONE ENCOUNTER
SERTRALINE HCL 50 MG TABS 06/05/2019 06/05/2019 50 mg 90 tablet 2 90 Kimberley Dyer MD [facility] 52 #. ..      Patient should have refills left on file

## 2019-12-16 ENCOUNTER — TELEPHONE (OUTPATIENT)
Dept: FAMILY MEDICINE CLINIC | Facility: CLINIC | Age: 39
End: 2019-12-16

## 2019-12-16 NOTE — TELEPHONE ENCOUNTER
Questions re:  Labs drawn last month by her GYN. Stated Dr. Sheyla Merino wanted here to get these labs, so she wants to make sure we have the results to go over.

## 2019-12-16 NOTE — TELEPHONE ENCOUNTER
BW results are scanned to her chart from November. Per pt, her OB recommended she start OTC iron. Pt wondering if there is anything else DB recommends based on the results. Please advise.

## 2019-12-19 NOTE — TELEPHONE ENCOUNTER
Tchol and LDL are elevated. Rec control with diet - low fat, avoid fried foods  Rest of BW looks ok.

## 2020-01-03 NOTE — TELEPHONE ENCOUNTER
LOV: 9/25/19 for loose stools    ALPRAZolam 0.25 MG Oral Tab 10 tablet 0 12/24/2018    Sig:   Take 1 tablet (0.25 mg total) by mouth daily as needed for Sleep. No future appointments.   Please advise

## 2020-01-04 RX ORDER — ALPRAZOLAM 0.25 MG/1
0.25 TABLET ORAL DAILY PRN
Qty: 10 TABLET | Refills: 0 | Status: SHIPPED | OUTPATIENT
Start: 2020-01-04 | End: 2020-04-02

## 2020-02-19 ENCOUNTER — TELEPHONE (OUTPATIENT)
Dept: FAMILY MEDICINE CLINIC | Facility: CLINIC | Age: 40
End: 2020-02-19

## 2020-02-20 ENCOUNTER — OFFICE VISIT (OUTPATIENT)
Dept: FAMILY MEDICINE CLINIC | Facility: CLINIC | Age: 40
End: 2020-02-20

## 2020-02-20 VITALS
OXYGEN SATURATION: 98 % | TEMPERATURE: 97 F | RESPIRATION RATE: 18 BRPM | HEIGHT: 67 IN | DIASTOLIC BLOOD PRESSURE: 82 MMHG | HEART RATE: 77 BPM | WEIGHT: 185 LBS | BODY MASS INDEX: 29.03 KG/M2 | SYSTOLIC BLOOD PRESSURE: 124 MMHG

## 2020-02-20 DIAGNOSIS — R10.33 PERIUMBILICAL PAIN: Primary | ICD-10-CM

## 2020-02-20 PROCEDURE — 99213 OFFICE O/P EST LOW 20 MIN: CPT | Performed by: FAMILY MEDICINE

## 2020-02-20 NOTE — PROGRESS NOTES
Patient presents with:  Abdominal Pain       HPI:    Patient ID: Rhina Bettencourt is a 44year old female. Abdominal pain x 4 weeks Localized to left side of belly button. Daily, Intermittent throughout the day. Aggrav factors: none identified.  Severity: Diabetes Neg       Social History: Social History    Tobacco Use      Smoking status: Former Smoker      Smokeless tobacco: Never Used    Alcohol use: No      Alcohol/week: 0.0 standard drinks    Drug use: No       PHYSICAL EXAM:    /82 (BP Location:

## 2020-03-02 ENCOUNTER — HOSPITAL ENCOUNTER (OUTPATIENT)
Dept: ULTRASOUND IMAGING | Age: 40
Discharge: HOME | End: 2020-03-02
Attending: FAMILY MEDICINE
Payer: COMMERCIAL

## 2020-03-02 DIAGNOSIS — R10.33 PERIUMBILICAL PAIN: ICD-10-CM

## 2020-03-02 PROCEDURE — 76700 US EXAM ABDOM COMPLETE: CPT | Performed by: FAMILY MEDICINE

## 2020-03-05 ENCOUNTER — OFFICE VISIT (OUTPATIENT)
Dept: FAMILY MEDICINE CLINIC | Facility: CLINIC | Age: 40
End: 2020-03-05

## 2020-03-05 VITALS
BODY MASS INDEX: 29 KG/M2 | TEMPERATURE: 100 F | DIASTOLIC BLOOD PRESSURE: 84 MMHG | WEIGHT: 185 LBS | RESPIRATION RATE: 16 BRPM | HEART RATE: 105 BPM | SYSTOLIC BLOOD PRESSURE: 126 MMHG | OXYGEN SATURATION: 99 %

## 2020-03-05 DIAGNOSIS — J10.1 INFLUENZA A: Primary | ICD-10-CM

## 2020-03-05 DIAGNOSIS — R68.89 FLU-LIKE SYMPTOMS: ICD-10-CM

## 2020-03-05 LAB
POCT INFLUENZA A: POSITIVE
POCT INFLUENZA B: NEGATIVE

## 2020-03-05 PROCEDURE — 99213 OFFICE O/P EST LOW 20 MIN: CPT | Performed by: PHYSICIAN ASSISTANT

## 2020-03-05 PROCEDURE — 87502 INFLUENZA DNA AMP PROBE: CPT | Performed by: PHYSICIAN ASSISTANT

## 2020-03-05 RX ORDER — OSELTAMIVIR PHOSPHATE 75 MG/1
75 CAPSULE ORAL 2 TIMES DAILY
Qty: 10 CAPSULE | Refills: 0 | Status: SHIPPED | OUTPATIENT
Start: 2020-03-05 | End: 2020-03-10

## 2020-03-05 RX ORDER — FLUTICASONE PROPIONATE 50 MCG
2 SPRAY, SUSPENSION (ML) NASAL DAILY
Qty: 1 INHALER | Refills: 0 | Status: SHIPPED | OUTPATIENT
Start: 2020-03-05 | End: 2020-05-15

## 2020-03-05 NOTE — PATIENT INSTRUCTIONS
-Sudafed and flonase  -Push fluids  -Motrin  -Cool mist humidifier at night      Influenza (Adult)    Influenza is also called the flu. It is a viral illness that affects the air passages of your lungs. It is different from the common cold.  The flu can eas advised, if you are not getting better over the next week. If you are age 72 or older, talk with your provider about getting a pneumococcal vaccine every 5 years. You should also get this vaccine if you have chronic asthma or COPD.  All adults should get a

## 2020-03-05 NOTE — PROGRESS NOTES
CHIEF COMPLAINT:   Patient presents with:  Headache: chills x 1 day. no fever  Cough: congestion x this am.       HPI:   Uriah Ricks is a 44year old female who presents for flu like symtoms for  1 day.   Patient reports body aches, chills, sweats, nasa LUNGS: denies shortness of breath or wheezing, See HPI  CARDIOVASCULAR: denies chest pain or palpitations   GI: denies V/C or abdominal pain  NEURO: + headaches    EXAM:   /84 (BP Location: Left arm, Patient Position: Sitting, Cuff Size: adult)   Pul Sig: Take 1 capsule (75 mg total) by mouth 2 (two) times daily for 5 days. • Fluticasone Propionate 50 MCG/ACT Nasal Suspension 1 Inhaler 0     Si sprays by Each Nare route daily.        Risks, benefits, and side effects of medication explained and · Over-the-counter cold medicines will not make the flu go away faster. But the medicines may help with coughing, sore throat, and congestion in your nose and sinuses. Don’t use a decongestant if you have high blood pressure.   · Stay home until your fever

## 2020-03-06 ENCOUNTER — PATIENT MESSAGE (OUTPATIENT)
Dept: FAMILY MEDICINE CLINIC | Facility: CLINIC | Age: 40
End: 2020-03-06

## 2020-03-07 NOTE — TELEPHONE ENCOUNTER
From: Adamaris Hudson  To: Jordan Gaffney MD  Sent: 3/6/2020 10:18 PM CST  Subject: Other    I went to the [place] walk in clinic on Thursday and was swabbed for the flu. I had a positive test result. Now I have no voice. ..very raspy.  Does this require anot

## 2020-03-07 NOTE — TELEPHONE ENCOUNTER
Pt went to [facility] on 3/5 and was positive for flu. Taking tamiflu and flonase. states her voice is raspy and still has fever.  Wants to know if she needs to be seen here

## 2020-03-08 ENCOUNTER — APPOINTMENT (OUTPATIENT)
Dept: GENERAL RADIOLOGY | Age: 40
End: 2020-03-08
Attending: NURSE PRACTITIONER
Payer: COMMERCIAL

## 2020-03-08 ENCOUNTER — HOSPITAL ENCOUNTER (OUTPATIENT)
Age: 40
Discharge: HOME | End: 2020-03-08
Payer: COMMERCIAL

## 2020-03-08 VITALS
OXYGEN SATURATION: 97 % | SYSTOLIC BLOOD PRESSURE: 106 MMHG | RESPIRATION RATE: 16 BRPM | DIASTOLIC BLOOD PRESSURE: 74 MMHG | HEART RATE: 92 BPM | TEMPERATURE: 98 F

## 2020-03-08 DIAGNOSIS — J40 BRONCHITIS: Primary | ICD-10-CM

## 2020-03-08 PROCEDURE — 99214 OFFICE O/P EST MOD 30 MIN: CPT

## 2020-03-08 PROCEDURE — 71046 X-RAY EXAM CHEST 2 VIEWS: CPT | Performed by: NURSE PRACTITIONER

## 2020-03-08 PROCEDURE — 99213 OFFICE O/P EST LOW 20 MIN: CPT

## 2020-03-08 RX ORDER — BENZONATATE 100 MG/1
100 CAPSULE ORAL 3 TIMES DAILY PRN
Qty: 30 CAPSULE | Refills: 0 | Status: SHIPPED | OUTPATIENT
Start: 2020-03-08 | End: 2020-04-07

## 2020-03-08 RX ORDER — PREDNISONE 20 MG/1
20 TABLET ORAL 2 TIMES DAILY
Qty: 10 TABLET | Refills: 0 | Status: SHIPPED | OUTPATIENT
Start: 2020-03-08 | End: 2020-03-13

## 2020-03-08 RX ORDER — ALBUTEROL SULFATE 90 UG/1
2 AEROSOL, METERED RESPIRATORY (INHALATION) EVERY 4 HOURS PRN
Qty: 1 INHALER | Refills: 0 | Status: SHIPPED | OUTPATIENT
Start: 2020-03-08 | End: 2020-04-07

## 2020-03-08 NOTE — ED PROVIDER NOTES
Patient Seen in: [id number] [facility]      History   Patient presents with:  Cough/URI    Stated Complaint: influenza, cough not getting better    49-year-old female who presents to the immediate care with a worsening cough over the past coupl congestion. Respiratory: Positive for cough. Musculoskeletal: Negative. Hematological: Negative. Psychiatric/Behavioral: Negative. All other systems reviewed and are negative.       Positive for stated complaint: influenza, cough not getting STATED HISTORY: (As transcribed by Technologist)  Productive cough for two days. Diagnosed and treated three days ago for influenza. FINDINGS:  LUNGS:  Mild bilateral peribronchial thickening. No focal infiltrate or consolidation.  CARDIAC:  Normal size tablet, R-0    Albuterol Sulfate  (90 Base) MCG/ACT Inhalation Aero Soln  Inhale 2 puffs into the lungs every 4 (four) hours as needed for Wheezing., Normal, Disp-1 Inhaler, R-0

## 2020-03-09 ENCOUNTER — TELEPHONE (OUTPATIENT)
Dept: FAMILY MEDICINE CLINIC | Facility: CLINIC | Age: 40
End: 2020-03-09

## 2020-03-09 NOTE — TELEPHONE ENCOUNTER
Pt notified and verbalized understanding.     Info sent through [facility] [address] per pts request.

## 2020-03-09 NOTE — TELEPHONE ENCOUNTER
----- Message from Shilpi Tong MD sent at 3/8/2020  5:50 PM CDT -----  abd u/s is wnl. If still having pain, rec consult with surgeon.  Refer to Dr. Kerry Marquez

## 2020-03-12 ENCOUNTER — PATIENT MESSAGE (OUTPATIENT)
Dept: FAMILY MEDICINE CLINIC | Facility: CLINIC | Age: 40
End: 2020-03-12

## 2020-03-12 NOTE — TELEPHONE ENCOUNTER
No travel. Recently had flu and bronchitis. No fever. Still has coughing with green mucus. Wants to know if she should come in or ok to monitor.

## 2020-03-12 NOTE — TELEPHONE ENCOUNTER
From: Roz Becker  To: Dallin Warner MD  Sent: 3/12/2020 10:35 AM CDT  Subject: Visit Follow-up Question    On Sunday I went to immediate care at your office. I was diagnosed with bronchitis after having the flu.  The doctor gave me steroids and said I

## 2020-04-02 RX ORDER — ALPRAZOLAM 0.25 MG/1
0.25 TABLET ORAL DAILY PRN
Qty: 10 TABLET | Refills: 0 | OUTPATIENT
Start: 2020-04-02 | End: 2020-07-02

## 2020-04-02 NOTE — TELEPHONE ENCOUNTER
ALPRAZolam 0.25 MG Oral Tab     Last refilled: 1/4/20 - 10 tablets - 0 refills -     Last OV: 2/20/20 - abdominal pain -     No future appointment. Please advise.

## 2020-04-07 ENCOUNTER — PATIENT MESSAGE (OUTPATIENT)
Dept: FAMILY MEDICINE CLINIC | Facility: CLINIC | Age: 40
End: 2020-04-07

## 2020-04-07 DIAGNOSIS — C44.91 BASAL CELL CARCINOMA (BCC), UNSPECIFIED SITE: Primary | ICD-10-CM

## 2020-04-08 NOTE — TELEPHONE ENCOUNTER
From: Marin West  To: Wallace Vargas MD  Sent: 4/7/2020 6:54 PM CDT  Subject: Referral Request    Dr. Marguerite Grimaldo had referred me to Dr. Aide Escalera in [place] for [place] cells in the past. Can I get another referral? I have a spot on my leg I am glen

## 2020-04-17 ENCOUNTER — HOSPITAL ENCOUNTER (EMERGENCY)
Facility: HOSPITAL | Age: 40
Discharge: HOME | End: 2020-04-17
Attending: EMERGENCY MEDICINE
Payer: COMMERCIAL

## 2020-04-17 ENCOUNTER — TELEPHONE (OUTPATIENT)
Dept: FAMILY MEDICINE CLINIC | Facility: CLINIC | Age: 40
End: 2020-04-17

## 2020-04-17 ENCOUNTER — APPOINTMENT (OUTPATIENT)
Dept: CT IMAGING | Facility: HOSPITAL | Age: 40
End: 2020-04-17
Attending: EMERGENCY MEDICINE
Payer: COMMERCIAL

## 2020-04-17 VITALS
OXYGEN SATURATION: 99 % | WEIGHT: 182 LBS | TEMPERATURE: 97 F | HEIGHT: 67 IN | BODY MASS INDEX: 28.56 KG/M2 | HEART RATE: 70 BPM | SYSTOLIC BLOOD PRESSURE: 129 MMHG | DIASTOLIC BLOOD PRESSURE: 84 MMHG | RESPIRATION RATE: 18 BRPM

## 2020-04-17 DIAGNOSIS — K52.9 ACUTE COLITIS: Primary | ICD-10-CM

## 2020-04-17 PROCEDURE — 99285 EMERGENCY DEPT VISIT HI MDM: CPT

## 2020-04-17 PROCEDURE — 85025 COMPLETE CBC W/AUTO DIFF WBC: CPT | Performed by: EMERGENCY MEDICINE

## 2020-04-17 PROCEDURE — 80053 COMPREHEN METABOLIC PANEL: CPT | Performed by: EMERGENCY MEDICINE

## 2020-04-17 PROCEDURE — 81025 URINE PREGNANCY TEST: CPT

## 2020-04-17 PROCEDURE — 99284 EMERGENCY DEPT VISIT MOD MDM: CPT

## 2020-04-17 PROCEDURE — 96361 HYDRATE IV INFUSION ADD-ON: CPT

## 2020-04-17 PROCEDURE — 74177 CT ABD & PELVIS W/CONTRAST: CPT | Performed by: EMERGENCY MEDICINE

## 2020-04-17 PROCEDURE — 81001 URINALYSIS AUTO W/SCOPE: CPT | Performed by: EMERGENCY MEDICINE

## 2020-04-17 PROCEDURE — 83690 ASSAY OF LIPASE: CPT | Performed by: EMERGENCY MEDICINE

## 2020-04-17 PROCEDURE — 96374 THER/PROPH/DIAG INJ IV PUSH: CPT

## 2020-04-17 RX ORDER — ONDANSETRON 2 MG/ML
4 INJECTION INTRAMUSCULAR; INTRAVENOUS ONCE
Status: COMPLETED | OUTPATIENT
Start: 2020-04-17 | End: 2020-04-17

## 2020-04-17 RX ORDER — AMOXICILLIN AND CLAVULANATE POTASSIUM 875; 125 MG/1; MG/1
1 TABLET, FILM COATED ORAL 2 TIMES DAILY
Qty: 14 TABLET | Refills: 0 | Status: SHIPPED | OUTPATIENT
Start: 2020-04-17 | End: 2020-04-24

## 2020-04-17 RX ORDER — ONDANSETRON 4 MG/1
4 TABLET, ORALLY DISINTEGRATING ORAL EVERY 4 HOURS PRN
Qty: 10 TABLET | Refills: 0 | Status: SHIPPED | OUTPATIENT
Start: 2020-04-17 | End: 2020-04-24

## 2020-04-17 NOTE — TELEPHONE ENCOUNTER
Spoke to Dr. Ann Muñoz. Per V.O send patient to ER. Spoke to patient. Patient advised. Verbalized understanding. Will go to ER now.

## 2020-04-17 NOTE — TELEPHONE ENCOUNTER
Pt states she has blood in her stool and is watery yesterda vomiting 6-7 times, this morning she was having cramps and went to the bathroom, and stool was a mucus and blood, sending to nurse to triage

## 2020-04-17 NOTE — ED PROVIDER NOTES
Patient Seen in: [facility] Emergency Department      History   Patient presents with:  GI Bleeding    Stated Complaint: GI bleed - bright red blood starting today    HPI    Patient is a 27-year-old female who presents for evaluation of abdominal pa 135/95   Pulse 70   Resp 18   Temp 97.1 °F (36.2 °C)   Temp src Temporal   SpO2 100 %   O2 Device        Current:/84   Pulse 70   Temp 97.1 °F (36.2 °C) (Temporal)   Resp 18   Ht 170.2 cm (5' 7\")   Wt 82.6 kg   LMP 04/07/2020   SpO2 99%   BMI 28.51 Abnormality         Status                     ---------                               -----------         ------                     CBC W/ DIFFERENTIAL[[id number]]          Abnormal            Final result                 Please evidence of hydronephrosis or hydroureter. ADRENALS:  No mass or enlargement. AORTA/VASCULAR:  No aneurysm or dissection. RETROPERITONEUM:  No mass or adenopathy. BOWEL/MESENTERY:  The stomach, duodenum sweep and small bowel are unremarkable.   The colon (primary encounter diagnosis)    Disposition:  Discharge  4/17/2020  1:37 pm    Follow-up:  Karime Moreno, [address] [facility] [address]  [phone]                Medications Prescribed:  Current Discharge Medication List    START taking thes

## 2020-04-17 NOTE — ED INITIAL ASSESSMENT (HPI)
Pt here for bright red rectal bleeding that started this am.pt had colitis x 1time. pt states nausea and watery diarrhea yesterday. Pt denies cough/cold or fever.

## 2020-04-17 NOTE — TELEPHONE ENCOUNTER
Spoke to patient. Had bodyaches, sweaty/cold, and vomited 6-7 times last night. Vomit was watery with little pieces of food. She felt lightheaded and pale at the time. Had constant pain in left lower quadrant.    Started going to bathroom which was so

## 2020-04-20 ENCOUNTER — PATIENT MESSAGE (OUTPATIENT)
Dept: FAMILY MEDICINE CLINIC | Facility: CLINIC | Age: 40
End: 2020-04-20

## 2020-04-20 NOTE — TELEPHONE ENCOUNTER
She will need a colonoscopy when things with covid resolves. Give contact for Dr. Membreno Board med for hosp f/u on Friday if she is not feeling better.

## 2020-04-21 ENCOUNTER — APPOINTMENT (OUTPATIENT)
Dept: CT IMAGING | Facility: HOSPITAL | Age: 40
End: 2020-04-21
Attending: EMERGENCY MEDICINE
Payer: COMMERCIAL

## 2020-04-21 ENCOUNTER — HOSPITAL ENCOUNTER (OUTPATIENT)
Facility: HOSPITAL | Age: 40
Setting detail: OBSERVATION
Discharge: HOME | End: 2020-04-23
Attending: EMERGENCY MEDICINE | Admitting: HOSPITALIST
Payer: COMMERCIAL

## 2020-04-21 DIAGNOSIS — N20.0 RENAL CALCULI: ICD-10-CM

## 2020-04-21 DIAGNOSIS — N20.0 KIDNEY STONE: Primary | ICD-10-CM

## 2020-04-21 PROCEDURE — 99220 INITIAL OBSERVATION CARE,LEVL III: CPT | Performed by: HOSPITALIST

## 2020-04-21 PROCEDURE — 74176 CT ABD & PELVIS W/O CONTRAST: CPT | Performed by: EMERGENCY MEDICINE

## 2020-04-21 RX ORDER — ONDANSETRON 2 MG/ML
4 INJECTION INTRAMUSCULAR; INTRAVENOUS EVERY 4 HOURS PRN
Status: DISCONTINUED | OUTPATIENT
Start: 2020-04-21 | End: 2020-04-22

## 2020-04-21 RX ORDER — KETOROLAC TROMETHAMINE 30 MG/ML
15 INJECTION, SOLUTION INTRAMUSCULAR; INTRAVENOUS ONCE
Status: COMPLETED | OUTPATIENT
Start: 2020-04-21 | End: 2020-04-21

## 2020-04-21 RX ORDER — HYDROCODONE BITARTRATE AND ACETAMINOPHEN 5; 325 MG/1; MG/1
1-2 TABLET ORAL EVERY 6 HOURS PRN
Status: DISCONTINUED | OUTPATIENT
Start: 2020-04-21 | End: 2020-04-22

## 2020-04-21 RX ORDER — ONDANSETRON 2 MG/ML
4 INJECTION INTRAMUSCULAR; INTRAVENOUS ONCE
Status: COMPLETED | OUTPATIENT
Start: 2020-04-21 | End: 2020-04-21

## 2020-04-21 RX ORDER — MORPHINE SULFATE 4 MG/ML
4 INJECTION, SOLUTION INTRAMUSCULAR; INTRAVENOUS ONCE
Status: COMPLETED | OUTPATIENT
Start: 2020-04-21 | End: 2020-04-21

## 2020-04-21 RX ORDER — MORPHINE SULFATE 4 MG/ML
4 INJECTION, SOLUTION INTRAMUSCULAR; INTRAVENOUS EVERY 30 MIN PRN
Status: DISCONTINUED | OUTPATIENT
Start: 2020-04-21 | End: 2020-04-22

## 2020-04-21 RX ORDER — SODIUM CHLORIDE 9 MG/ML
INJECTION, SOLUTION INTRAVENOUS CONTINUOUS
Status: ACTIVE | OUTPATIENT
Start: 2020-04-21 | End: 2020-04-22

## 2020-04-22 ENCOUNTER — ANESTHESIA (OUTPATIENT)
Dept: SURGERY | Facility: HOSPITAL | Age: 40
End: 2020-04-22
Payer: COMMERCIAL

## 2020-04-22 ENCOUNTER — ANESTHESIA EVENT (OUTPATIENT)
Dept: SURGERY | Facility: HOSPITAL | Age: 40
End: 2020-04-22
Payer: COMMERCIAL

## 2020-04-22 ENCOUNTER — APPOINTMENT (OUTPATIENT)
Dept: GENERAL RADIOLOGY | Facility: HOSPITAL | Age: 40
End: 2020-04-22
Attending: UROLOGY
Payer: COMMERCIAL

## 2020-04-22 PROCEDURE — 99225 SUBSEQUENT OBSERVATION CARE: CPT | Performed by: INTERNAL MEDICINE

## 2020-04-22 PROCEDURE — 0TC78ZZ EXTIRPATION OF MATTER FROM LEFT URETER, VIA NATURAL OR ARTIFICIAL OPENING ENDOSCOPIC: ICD-10-PCS | Performed by: UROLOGY

## 2020-04-22 PROCEDURE — 0T778DZ DILATION OF LEFT URETER WITH INTRALUMINAL DEVICE, VIA NATURAL OR ARTIFICIAL OPENING ENDOSCOPIC: ICD-10-PCS | Performed by: UROLOGY

## 2020-04-22 DEVICE — URETERAL STENT
Type: IMPLANTABLE DEVICE | Site: URETER | Status: FUNCTIONAL
Brand: ASCERTA™

## 2020-04-22 RX ORDER — LIDOCAINE HYDROCHLORIDE 10 MG/ML
INJECTION, SOLUTION EPIDURAL; INFILTRATION; INTRACAUDAL; PERINEURAL AS NEEDED
Status: DISCONTINUED | OUTPATIENT
Start: 2020-04-22 | End: 2020-04-22 | Stop reason: SURG

## 2020-04-22 RX ORDER — NALOXONE HYDROCHLORIDE 0.4 MG/ML
80 INJECTION, SOLUTION INTRAMUSCULAR; INTRAVENOUS; SUBCUTANEOUS AS NEEDED
Status: DISCONTINUED | OUTPATIENT
Start: 2020-04-22 | End: 2020-04-22 | Stop reason: HOSPADM

## 2020-04-22 RX ORDER — SODIUM CHLORIDE 9 MG/ML
INJECTION, SOLUTION INTRAVENOUS CONTINUOUS
Status: DISCONTINUED | OUTPATIENT
Start: 2020-04-22 | End: 2020-04-23

## 2020-04-22 RX ORDER — MORPHINE SULFATE 4 MG/ML
2 INJECTION, SOLUTION INTRAMUSCULAR; INTRAVENOUS
Status: DISCONTINUED | OUTPATIENT
Start: 2020-04-22 | End: 2020-04-23

## 2020-04-22 RX ORDER — CEFAZOLIN SODIUM/WATER 2 G/20 ML
2 SYRINGE (ML) INTRAVENOUS ONCE
Status: COMPLETED | OUTPATIENT
Start: 2020-04-22 | End: 2020-04-22

## 2020-04-22 RX ORDER — DIPHENHYDRAMINE HYDROCHLORIDE 50 MG/ML
12.5 INJECTION INTRAMUSCULAR; INTRAVENOUS AS NEEDED
Status: DISCONTINUED | OUTPATIENT
Start: 2020-04-22 | End: 2020-04-22 | Stop reason: HOSPADM

## 2020-04-22 RX ORDER — HYDROMORPHONE HYDROCHLORIDE 1 MG/ML
0.4 INJECTION, SOLUTION INTRAMUSCULAR; INTRAVENOUS; SUBCUTANEOUS EVERY 5 MIN PRN
Status: DISCONTINUED | OUTPATIENT
Start: 2020-04-22 | End: 2020-04-22 | Stop reason: HOSPADM

## 2020-04-22 RX ORDER — FLUTICASONE PROPIONATE 50 MCG
2 SPRAY, SUSPENSION (ML) NASAL DAILY
Status: DISCONTINUED | OUTPATIENT
Start: 2020-04-22 | End: 2020-04-22

## 2020-04-22 RX ORDER — METOCLOPRAMIDE HYDROCHLORIDE 5 MG/ML
10 INJECTION INTRAMUSCULAR; INTRAVENOUS EVERY 8 HOURS PRN
Status: DISCONTINUED | OUTPATIENT
Start: 2020-04-21 | End: 2020-04-23

## 2020-04-22 RX ORDER — MIDAZOLAM HYDROCHLORIDE 1 MG/ML
1 INJECTION INTRAMUSCULAR; INTRAVENOUS EVERY 5 MIN PRN
Status: DISCONTINUED | OUTPATIENT
Start: 2020-04-22 | End: 2020-04-22 | Stop reason: HOSPADM

## 2020-04-22 RX ORDER — GLYCOPYRROLATE 0.2 MG/ML
INJECTION, SOLUTION INTRAMUSCULAR; INTRAVENOUS AS NEEDED
Status: DISCONTINUED | OUTPATIENT
Start: 2020-04-22 | End: 2020-04-22 | Stop reason: SURG

## 2020-04-22 RX ORDER — HYDROCODONE BITARTRATE AND ACETAMINOPHEN 5; 325 MG/1; MG/1
1 TABLET ORAL AS NEEDED
Status: DISCONTINUED | OUTPATIENT
Start: 2020-04-22 | End: 2020-04-22 | Stop reason: HOSPADM

## 2020-04-22 RX ORDER — ACETAMINOPHEN 500 MG
500 TABLET ORAL EVERY 4 HOURS PRN
Status: DISCONTINUED | OUTPATIENT
Start: 2020-04-22 | End: 2020-04-23

## 2020-04-22 RX ORDER — KETAMINE HYDROCHLORIDE 50 MG/ML
INJECTION, SOLUTION, CONCENTRATE INTRAMUSCULAR; INTRAVENOUS AS NEEDED
Status: DISCONTINUED | OUTPATIENT
Start: 2020-04-22 | End: 2020-04-22 | Stop reason: SURG

## 2020-04-22 RX ORDER — DEXAMETHASONE SODIUM PHOSPHATE 4 MG/ML
4 VIAL (ML) INJECTION AS NEEDED
Status: DISCONTINUED | OUTPATIENT
Start: 2020-04-22 | End: 2020-04-22 | Stop reason: HOSPADM

## 2020-04-22 RX ORDER — MAGNESIUM HYDROXIDE/ALUMINUM HYDROXICE/SIMETHICONE 120; 1200; 1200 MG/30ML; MG/30ML; MG/30ML
30 SUSPENSION ORAL 4 TIMES DAILY PRN
Status: DISCONTINUED | OUTPATIENT
Start: 2020-04-22 | End: 2020-04-23

## 2020-04-22 RX ORDER — ONDANSETRON 2 MG/ML
4 INJECTION INTRAMUSCULAR; INTRAVENOUS EVERY 6 HOURS PRN
Status: DISCONTINUED | OUTPATIENT
Start: 2020-04-21 | End: 2020-04-23

## 2020-04-22 RX ORDER — DEXAMETHASONE SODIUM PHOSPHATE 4 MG/ML
VIAL (ML) INJECTION AS NEEDED
Status: DISCONTINUED | OUTPATIENT
Start: 2020-04-22 | End: 2020-04-22 | Stop reason: SURG

## 2020-04-22 RX ORDER — EPHEDRINE SULFATE 50 MG/ML
INJECTION, SOLUTION INTRAVENOUS AS NEEDED
Status: DISCONTINUED | OUTPATIENT
Start: 2020-04-22 | End: 2020-04-22 | Stop reason: SURG

## 2020-04-22 RX ORDER — ONDANSETRON 2 MG/ML
4 INJECTION INTRAMUSCULAR; INTRAVENOUS AS NEEDED
Status: DISCONTINUED | OUTPATIENT
Start: 2020-04-22 | End: 2020-04-22 | Stop reason: HOSPADM

## 2020-04-22 RX ORDER — HYDROCODONE BITARTRATE AND ACETAMINOPHEN 5; 325 MG/1; MG/1
2 TABLET ORAL AS NEEDED
Status: DISCONTINUED | OUTPATIENT
Start: 2020-04-22 | End: 2020-04-22 | Stop reason: HOSPADM

## 2020-04-22 RX ORDER — HYDROCODONE BITARTRATE AND ACETAMINOPHEN 5; 325 MG/1; MG/1
1 TABLET ORAL EVERY 6 HOURS PRN
Status: DISCONTINUED | OUTPATIENT
Start: 2020-04-22 | End: 2020-04-23

## 2020-04-22 RX ORDER — ACETAMINOPHEN 325 MG/1
650 TABLET ORAL EVERY 6 HOURS PRN
Status: DISCONTINUED | OUTPATIENT
Start: 2020-04-21 | End: 2020-04-22

## 2020-04-22 RX ORDER — LABETALOL HYDROCHLORIDE 5 MG/ML
5 INJECTION, SOLUTION INTRAVENOUS EVERY 5 MIN PRN
Status: DISCONTINUED | OUTPATIENT
Start: 2020-04-22 | End: 2020-04-22 | Stop reason: HOSPADM

## 2020-04-22 RX ORDER — KETOROLAC TROMETHAMINE 30 MG/ML
30 INJECTION, SOLUTION INTRAMUSCULAR; INTRAVENOUS EVERY 6 HOURS PRN
Status: DISCONTINUED | OUTPATIENT
Start: 2020-04-22 | End: 2020-04-23

## 2020-04-22 RX ORDER — CALCIUM CARBONATE 200(500)MG
500 TABLET,CHEWABLE ORAL
Status: DISCONTINUED | OUTPATIENT
Start: 2020-04-22 | End: 2020-04-23

## 2020-04-22 RX ORDER — SODIUM CHLORIDE, SODIUM LACTATE, POTASSIUM CHLORIDE, CALCIUM CHLORIDE 600; 310; 30; 20 MG/100ML; MG/100ML; MG/100ML; MG/100ML
INJECTION, SOLUTION INTRAVENOUS CONTINUOUS
Status: DISCONTINUED | OUTPATIENT
Start: 2020-04-22 | End: 2020-04-22 | Stop reason: HOSPADM

## 2020-04-22 RX ADMIN — EPHEDRINE SULFATE 5 MG: 50 INJECTION, SOLUTION INTRAVENOUS at 11:20:00

## 2020-04-22 RX ADMIN — GLYCOPYRROLATE 0.1 MG: 0.2 INJECTION, SOLUTION INTRAMUSCULAR; INTRAVENOUS at 11:08:00

## 2020-04-22 RX ADMIN — EPHEDRINE SULFATE 5 MG: 50 INJECTION, SOLUTION INTRAVENOUS at 11:19:00

## 2020-04-22 RX ADMIN — KETAMINE HYDROCHLORIDE 25 MG: 50 INJECTION, SOLUTION, CONCENTRATE INTRAMUSCULAR; INTRAVENOUS at 11:08:00

## 2020-04-22 RX ADMIN — EPHEDRINE SULFATE 5 MG: 50 INJECTION, SOLUTION INTRAVENOUS at 11:48:00

## 2020-04-22 RX ADMIN — ONDANSETRON 4 MG: 2 INJECTION INTRAMUSCULAR; INTRAVENOUS at 11:40:00

## 2020-04-22 RX ADMIN — EPHEDRINE SULFATE 5 MG: 50 INJECTION, SOLUTION INTRAVENOUS at 11:34:00

## 2020-04-22 RX ADMIN — SODIUM CHLORIDE: 9 INJECTION, SOLUTION INTRAVENOUS at 11:40:00

## 2020-04-22 RX ADMIN — DEXAMETHASONE SODIUM PHOSPHATE 8 MG: 4 MG/ML VIAL (ML) INJECTION at 11:20:00

## 2020-04-22 RX ADMIN — LIDOCAINE HYDROCHLORIDE 100 MG: 10 INJECTION, SOLUTION EPIDURAL; INFILTRATION; INTRACAUDAL; PERINEURAL at 11:08:00

## 2020-04-22 RX ADMIN — KETOROLAC TROMETHAMINE 30 MG: 30 INJECTION, SOLUTION INTRAMUSCULAR; INTRAVENOUS at 11:39:00

## 2020-04-22 NOTE — ANESTHESIA POSTPROCEDURE EVALUATION
[facility] Patient Status:  Observation   Age/Gender 44year old female MRN [id number]   [facility] SURGERY Attending Fior Pate MD   Hosp Day # 0 PCP Sarah Lancaster MD       Anesthesia Post-op Note    Procedure(

## 2020-04-22 NOTE — PROGRESS NOTES
[facility]  Progress Note     Margarita Dallas Patient Status:  Observation    1980 MRN [id number]   [facility] 2NE-A Attending Grazyna Mckeon MD   Hosp Day # 0 PCP Ankur Lopez MD     Chief Complaint: flank pain    S: Rag[facility] Imaging: Imaging data reviewed in Epic. Medications:   • Sertraline HCl  50 mg Oral Daily   • ceFAZolin  2 g Intravenous Once   • piperacillin-tazobactam  3.375 g Intravenous Q8H       ASSESSMENT / PLAN:     1. Ureteral calculus  1.  Urology consult

## 2020-04-22 NOTE — PLAN OF CARE
Problem: Patient/Family Goals  Cystoscopy today  Goal: Patient/Family Long Term Goal  Description  Patient's Long Term Goal: To go home today    Interventions:  - Cystoscopy, Ureteraloscopy today  - See additional Care Plan goals for specific interventio Administer growth factors as ordered  - Implement neutropenic guidelines  Outcome: Progressing     Problem: SAFETY ADULT - FALL  Fall precautions in progress  Goal: Free from fall injury  Description  INTERVENTIONS:  - Assess pt frequently for physical nee

## 2020-04-22 NOTE — OPERATIVE REPORT
OPERATIVE REPORT    [facility] Patient Status:  Observation    1980 MRN [id number]   Location [facility] Attending Randall Martinez MD   Hosp Day # 0 PCP Mirna Sharp MD       [address] [place] [address] ureteroscope up into the intrarenal collecting system and the 2 lower pole stones were too small to grasp with a basket and were therefore left in place. She will have to pass these spontaneously at some point.   A 4.8 -Portuguese 24-cm stent was then passed o

## 2020-04-22 NOTE — PLAN OF CARE
Assumed pt care at 2300. A&Ox4. RA, denies chest pain/SOB, lung sounds clear, VSS, Not on tele. Voids/continent. Up ad kiesha. Flank pain.  POC STRAIN ALL URINE, ureteroscopy and stone extraction in AM, zosyn q8 (colitis), IVF NS @100, monitor pain, collect UA deficits and behaviors that affect risk of falls.   - [place] fall precautions as indicated by assessment.  - Educate pt/family on patient safety including physical limitations  - Instruct pt to call for assistance with activity based on assessment  - Mod

## 2020-04-22 NOTE — ED INITIAL ASSESSMENT (HPI)
Pt reports she was here a couple of days ago and diagnosed with colitis and started taking augmentin and reports worsening abd pain and nausea since then.

## 2020-04-22 NOTE — CONSULTS
[facility]  Report of Consultation    Migueldiana Estradaodalys Patient Status:  Observation    1980 MRN [id number]   [facility] 2NE-A Attending Naima Owusu MD   Hosp Day # 0 PCP Wallace Vargas MD     Impression and Plan:  7 mm left d mg, Intravenous, Q30 Min PRN  ondansetron HCl (ZOFRAN) injection 4 mg, 4 mg, Intravenous, Q4H PRN      History:  Past Medical History:   Diagnosis Date   • Anxiety    • Colitis    • Endometriosis    • History of blood transfusion 2001     After emergency s clubbing or cyanosis. Skin: Normal texture and turgor. Neurologic:  Motor strength and sensory examination is grossly normal.  No focal neurologic deficit.     Laboratory Data:  Lab Results   Component Value Date    WBC 9.9 04/21/2020    HGB 12.9 04/21/

## 2020-04-22 NOTE — H&P
[facility]  History and Physical     Doreen Gift Patient Status:  Observation    1980 MRN [id number]   [facility] 2NE-A Attending Jerry Whitten MD   Hosp Day # 0 PCP Deondre Berman MD     Chief Complaint: abdominal an f current facility-administered medications on file prior to encounter. Amoxicillin-Pot Clavulanate 875-125 MG Oral Tab, Take 1 tablet by mouth 2 (two) times daily for 7 days. , Disp: 14 tablet, Rfl: 0  Sertraline HCl 50 MG Oral Tab, TAKE 1 TABLET(50 MG) BY Lab 04/17/20  0939 04/21/20  1931   GLU 99 107*   BUN 15 15   CREATSERUM 0.86 0.96   GFRAA 98 86   GFRNAA 85 75   CA 9.3 9.6   ALB 4.2 4.3    137   K 4.6 4.3    105   CO2 28.0 25.0   ALKPHO 61 56   AST 13* 25   ALT 24 23   BILT 0.4 0.4   TP 7

## 2020-04-22 NOTE — PROGRESS NOTES
Interviewed, examined pt, answered questions. No new history or findings. Lungs: clear  Heart: No M,G,R  Abd: soft, Non tender  Neuro: Alert. Moving all extr  Procede as planned.   Jane Ortiz MD

## 2020-04-22 NOTE — ANESTHESIA PREPROCEDURE EVALUATION
PRE-OP EVALUATION    Patient Name: Uriah Ricks    Pre-op Diagnosis: Renal calculi [N20.0]    Procedure(s):  CYSTOSCOPY  LEFT RETROGRADE ,URETEROSCOPY, LASER LITHOTRIPSY LEFT STENT PLACEMENT    Surgeon(s) and Role:     * Emily Pressley MD - Primary    Pr has no known allergies. Anesthesia Evaluation    Patient summary reviewed.     Anesthetic Complications  (-) history of anesthetic complications         GI/Hepatic/Renal  Comment: Nephrolithiasis                               Cardiovascular    Negative patient. Comment: Options, risks (medication reaction, aspiration, nausea, injury to lips teeth tongue, cardiopulmonary complications) and benefits of anesthesia as outlined in the anesthesia consent were reviewed with the patient.  The consent was camilla

## 2020-04-22 NOTE — PLAN OF CARE
Patient return from Cystoscopy, VSS, reports she has an urgency to urinate, Stent intact. IV fluids infusing. Denies pain . Voided 500 cc of pink tinged urine per Bedpan , denies dysuria. Afebrile. Frequent VSS in progress.  Dangler from ureteral stent obse

## 2020-04-22 NOTE — ANESTHESIA PROCEDURE NOTES
Airway  Urgency: elective      General Information and Staff    Patient location during procedure: OR  Anesthesiologist: Ana Maria Burgess DO  Performed: anesthesiologist     Indications and Patient Condition  Indications for airway management: anesthesia

## 2020-04-22 NOTE — ED PROVIDER NOTES
Patient Seen in: [facility] Emergency Department      History   Patient presents with:  Nausea/Vomiting/Diarrhea    Stated Complaint: nv    HPI    40-year-old female presents with abdominal pain, vomiting, diarrhea.   She was seen in this emergency r 04/21/20 1928 97 %   O2 Device 04/21/20 1928 None (Room air)       Current:BP (!) 130/91   Pulse 85   Temp 97.1 °F (36.2 °C)   Resp 18   LMP 04/07/2020   SpO2 98%         Physical Exam    General:  Vitals as listed. Appears uncomfortable.   Pacing the room 9:10 AM.  INDICATIONS:  GI bleed - bright red blood starting today  TECHNIQUE:  CT scanning was performed from the dome of the diaphragm to the pubic symphysis with non-ionic intravenous contrast material. Post contrast coronal MPR imaging was performed. OTHER:  Negative. CONCLUSION:  1. Prominence of the colonic wall extending from the descending colon to the rectosigmoid colon, suspicious for either a colitis or prominence of the wall due to under distension. Please correlate clinically.  2. Nonobstruc or free fluid. ABDOMINAL WALL:  Small fat containing umbilical hernia. PELVIC ORGANS:  Decompressed urinary bladder. Unremarkable uterus and ovaries. LYMPH NODES:  No lymphadenopathy in the abdomen or pelvis.  BONES:  Degenerative changes in the spine OTHE

## 2020-04-23 VITALS
TEMPERATURE: 98 F | OXYGEN SATURATION: 100 % | WEIGHT: 181.88 LBS | BODY MASS INDEX: 28 KG/M2 | HEART RATE: 78 BPM | SYSTOLIC BLOOD PRESSURE: 124 MMHG | RESPIRATION RATE: 16 BRPM | DIASTOLIC BLOOD PRESSURE: 72 MMHG

## 2020-04-23 PROCEDURE — 99217 OBSERVATION CARE DISCHARGE: CPT | Performed by: INTERNAL MEDICINE

## 2020-04-23 NOTE — PLAN OF CARE
Received patient on bed, sleeping, rousable. Denied any pain. Denied SOB. Up to bathroom with steady gait. Voiding without difficulty. Discussed POC. Due meds given. Encouraged to verbalize needs and concerns.  Safety measures reinforced, call light within appropriate  Outcome: Progressing     Problem: RISK FOR INFECTION - ADULT  Goal: Absence of fever/infection during anticipated neutropenic period  Description  INTERVENTIONS  - Monitor WBC  - Administer growth factors as ordered  - Implement neutropenic gu

## 2020-04-23 NOTE — PROGRESS NOTES
Terry [email or web address].cd and pt discharge instructions reviewed w pt. No questions. Pt to [facility] in stable condition per wheelchair.

## 2020-04-23 NOTE — PROGRESS NOTES
Pt awake, alert and anxious to go home. See assmt. Awaiting hospitalist rounds for discharge orders. Pt denies pain.  Voiding tan color urine with sediment without difficulty

## 2020-04-23 NOTE — PLAN OF CARE
Pt received at 1500. Pt alert and oriented- s/p cystoscopy/kidney stone removal/ureter stent placement. Pt ambulating well with steady gait/tolerating PO intake as well. Pt medicated with toradol for some post procedure pain with good relief.   Pt also c/

## 2020-04-23 NOTE — DISCHARGE SUMMARY
[facility]OSPITALIST  DISCHARGE SUMMARY     Benitez Babin Patient Status:  Observation    1980 MRN [id number]   [facility] 2NE-A Attending Allyson Segura MD   Hosp Day # 0 PCP Kwasi Salcido MD     Date of Admission: 2020  D (two) times daily for 7 days. Stop taking on:  April 24, 2020  Quantity:  14 tablet  Refills:  0     Fluticasone Propionate 50 MCG/ACT Susp  Commonly known as:  FLONASE      2 sprays by Each Nare route daily.    Quantity:  1 Inhaler  Refills:  0     ondan

## 2020-04-27 ENCOUNTER — TELEPHONE (OUTPATIENT)
Dept: FAMILY MEDICINE CLINIC | Facility: CLINIC | Age: 40
End: 2020-04-27

## 2020-04-27 ENCOUNTER — PATIENT MESSAGE (OUTPATIENT)
Dept: FAMILY MEDICINE CLINIC | Facility: CLINIC | Age: 40
End: 2020-04-27

## 2020-04-27 DIAGNOSIS — K52.9 COLITIS: ICD-10-CM

## 2020-04-27 DIAGNOSIS — N20.0 KIDNEY STONE: Primary | ICD-10-CM

## 2020-04-27 NOTE — TELEPHONE ENCOUNTER
Pt was in the [facility] on 4/21 for kidney stones, 1 was removed but still has a few remaining, needs a referral for Urologist.     Pt states she was also in the hospital for Colitis and dr Grey Cuevas mentioned getting a colonoscopy, wants to now if dr Grey Cuevas s

## 2020-04-27 NOTE — TELEPHONE ENCOUNTER
Yes for GI consult once safe to do so.place referral for Dr. Rodrigo Penaloza or Dr. Brittanie Bermudez. RE: colitis. Okay to place referral for urology, Dr. Luis Felipe Murillo.  Dx kidney stone

## 2020-04-27 NOTE — TELEPHONE ENCOUNTER
From: Angela Whittington  To: Mirna Sharp MD  Sent: 4/27/2020 11:06 AM CDT  Subject: Referral Request    Dr Rodney Gregory referred me to Abdifatah Jain for a urologist. He is actually the dr that did my surgery on Wednesday. Is he part of [address] [place] [address] group?  I need so

## 2020-04-30 ENCOUNTER — PATIENT MESSAGE (OUTPATIENT)
Dept: FAMILY MEDICINE CLINIC | Facility: CLINIC | Age: 40
End: 2020-04-30

## 2020-04-30 NOTE — TELEPHONE ENCOUNTER
From: Marin West  To: Wallace Vargas MD  Sent: 4/30/2020 11:10 AM CDT  Subject: Referral Request    Can I have a referral for Sada Yost submitted? I didnt think I could see him because of insurance but I guess I can. Thanks!

## 2020-05-02 ENCOUNTER — PATIENT MESSAGE (OUTPATIENT)
Dept: FAMILY MEDICINE CLINIC | Facility: CLINIC | Age: 40
End: 2020-05-02

## 2020-05-04 ENCOUNTER — PATIENT MESSAGE (OUTPATIENT)
Dept: FAMILY MEDICINE CLINIC | Facility: CLINIC | Age: 40
End: 2020-05-04

## 2020-05-04 NOTE — TELEPHONE ENCOUNTER
Spoke to patient. When stent was removed on Friday 4/24, patient had a sharp pain in left side. For the past week, the site is now tender to touch or movement. Pain is not sharp anymore. Pain does not radiate. Patient said she can bend and move fine.

## 2020-05-04 NOTE — TELEPHONE ENCOUNTER
From: Rhina Bettencourt  To: Miki Ferrer MD  Sent: 5/2/2020 12:06 PM CDT  Subject: Non-Urgent Medical Question    Hello! On April 21 I had a kidney stone removed and a stent placed (which I removed April 24).  I have been having discomfort on my left si

## 2020-05-04 NOTE — TELEPHONE ENCOUNTER
LMTCB to get more info. Possible schedule with doctor in clinic this week if needed. Pain is radiating? Urinary sx? Rash? Fever? N/V? Any assoc sx?

## 2020-05-05 NOTE — TELEPHONE ENCOUNTER
[facility] sent to patient in other patient message from 5/4/20.  Patient read [facility] on 5/5/20 at 8:16 AM

## 2020-05-05 NOTE — TELEPHONE ENCOUNTER
From: Ike Meade  To: Trenton Lora MD  Sent: 5/4/2020 5:27 PM CDT  Subject: Non-Urgent Medical Question    Hello! I called the office back today and Nereida Oliver noted the symptoms I was having. I just want to make sure the information was passed along.  Dinesh Saldana

## 2020-05-10 ENCOUNTER — PATIENT MESSAGE (OUTPATIENT)
Dept: FAMILY MEDICINE CLINIC | Facility: CLINIC | Age: 40
End: 2020-05-10

## 2020-05-11 NOTE — TELEPHONE ENCOUNTER
5/5/2020  8:15 AM Christophe Barger RN Patient Medical Advice Request  RE: Non-Urgent Medical Question

## 2020-05-11 NOTE — TELEPHONE ENCOUNTER
From: Bharat Gaxiola  To: Zain Zhang MD  Sent: 5/10/2020 7:47 PM CDT  Subject: Non-Urgent Medical Question    Hello,  I still have the tenderness on my left side.      Melissa Ward

## 2020-05-13 NOTE — TELEPHONE ENCOUNTER
Patient states still having tenderness on left side to the touch. States stools are dark brown, almost black.  No other new symptoms

## 2020-05-15 ENCOUNTER — OFFICE VISIT (OUTPATIENT)
Dept: FAMILY MEDICINE CLINIC | Facility: CLINIC | Age: 40
End: 2020-05-15

## 2020-05-15 ENCOUNTER — TELEPHONE (OUTPATIENT)
Dept: FAMILY MEDICINE CLINIC | Facility: CLINIC | Age: 40
End: 2020-05-15

## 2020-05-15 ENCOUNTER — PATIENT MESSAGE (OUTPATIENT)
Dept: FAMILY MEDICINE CLINIC | Facility: CLINIC | Age: 40
End: 2020-05-15

## 2020-05-15 VITALS
TEMPERATURE: 97 F | HEART RATE: 83 BPM | DIASTOLIC BLOOD PRESSURE: 72 MMHG | RESPIRATION RATE: 18 BRPM | HEIGHT: 67 IN | OXYGEN SATURATION: 98 % | WEIGHT: 184 LBS | BODY MASS INDEX: 28.88 KG/M2 | SYSTOLIC BLOOD PRESSURE: 116 MMHG

## 2020-05-15 DIAGNOSIS — R10.12 LUQ ABDOMINAL PAIN: Primary | ICD-10-CM

## 2020-05-15 DIAGNOSIS — R19.5 DARK STOOLS: ICD-10-CM

## 2020-05-15 PROCEDURE — 85025 COMPLETE CBC W/AUTO DIFF WBC: CPT | Performed by: FAMILY MEDICINE

## 2020-05-15 PROCEDURE — 1111F DSCHRG MED/CURRENT MED MERGE: CPT | Performed by: FAMILY MEDICINE

## 2020-05-15 PROCEDURE — 99214 OFFICE O/P EST MOD 30 MIN: CPT | Performed by: FAMILY MEDICINE

## 2020-05-15 NOTE — PROGRESS NOTES
Patient presents with:  Abdominal Pain: Lt side x 3 weeks       HPI:    Patient ID: Lynder Castleman is a 44year old female here for visit today. .  C/o LUQ abdominal pain  Onset: since stent removed on 4/24.  Tx for kidney stone in April  Continues to have Heartburn    • Heavy menses    • History of blood transfusion 2001     After emergency surgery.    • Indigestion    • Infertility, female     ivf   • Irregular bowel habits    • Menses painful    • Pain with bowel movements    • Polycystic ovarian disease hepatosplenomegaly. There is no tenderness. Lymphadenopathy:     She has no cervical adenopathy. Skin: No rash noted.             ASSESSMENT/PLAN:   Luq abdominal pain  (primary encounter diagnosis)  Dark stools  LUQ pain likely musculo-skeletal. Clinic

## 2020-05-15 NOTE — TELEPHONE ENCOUNTER
It looks like appt was accidentally scheduled for 5/29 at 8:40. Per pt message from 5/10/20 DB wanted to see patient on 5/15.     Future Appointments   Date Time Provider Bernard Rodgers   5/19/2020  1:30 PM Fabiola ZEPEDA MD [facility]   5/29

## 2020-05-15 NOTE — TELEPHONE ENCOUNTER
From: Ej Montemayor  To: Giovanni Severe, MD  Sent: 5/15/2020 8:50 AM CDT  Subject: Visit Follow-up Question    Hello. I have an appointment at 840am today and was told to call when I arrived. No one is answering the phone.

## 2020-06-15 ENCOUNTER — TELEPHONE (OUTPATIENT)
Dept: FAMILY MEDICINE CLINIC | Facility: CLINIC | Age: 40
End: 2020-06-15

## 2020-07-02 RX ORDER — ALPRAZOLAM 0.25 MG/1
0.25 TABLET ORAL DAILY PRN
Qty: 10 TABLET | Refills: 0 | Status: SHIPPED | OUTPATIENT
Start: 2020-07-02 | End: 2020-11-04

## 2020-07-02 NOTE — TELEPHONE ENCOUNTER
Last refilled on 4/2/20 #10 0 refill  Last OV 5/15/20 for LUQ pain  Future Appointments   Date Time Provider Bernard Rodgers   7/20/2020  9:30 AM [facility]   7/22/2020  1:30 [place] [address]

## 2020-07-20 ENCOUNTER — LAB ENCOUNTER (OUTPATIENT)
Dept: LAB | Facility: HOSPITAL | Age: 40
End: 2020-07-20
Attending: INTERNAL MEDICINE
Payer: COMMERCIAL

## 2020-07-20 DIAGNOSIS — Z01.818 PREOP TESTING: ICD-10-CM

## 2020-07-21 LAB — SARS-COV-2 RNA RESP QL NAA+PROBE: NOT DETECTED

## 2020-07-22 PROBLEM — K59.1 FUNCTIONAL DIARRHEA: Status: ACTIVE | Noted: 2020-07-22

## 2020-07-22 PROBLEM — K92.1 BLOOD IN STOOL: Status: ACTIVE | Noted: 2020-07-22

## 2020-07-22 PROBLEM — R93.3 ABNORMAL CT SCAN, GASTROINTESTINAL TRACT: Status: ACTIVE | Noted: 2020-07-22

## 2020-07-22 PROCEDURE — 88305 TISSUE EXAM BY PATHOLOGIST: CPT | Performed by: INTERNAL MEDICINE

## 2020-07-27 ENCOUNTER — TELEPHONE (OUTPATIENT)
Dept: FAMILY MEDICINE CLINIC | Facility: CLINIC | Age: 40
End: 2020-07-27

## 2020-07-27 NOTE — TELEPHONE ENCOUNTER
Pt was seen in May for pain in her side, pt states she still has the pain, and now it has gotten worse and its constant. No fever, no nausea or vomiting.

## 2020-07-27 NOTE — TELEPHONE ENCOUNTER
Call from patient. States was seen in May for left sided pain. States pain is worse. It was just tender to the touch, now it is constant ache. No injury to area. Started right after kidney stone surgery. Surgeon said it is unrelated to surgery. Thought mus

## 2020-07-31 ENCOUNTER — TELEPHONE (OUTPATIENT)
Dept: FAMILY MEDICINE CLINIC | Facility: CLINIC | Age: 40
End: 2020-07-31

## 2020-07-31 NOTE — TELEPHONE ENCOUNTER
rec be evaluated in [address] [place] [address]. They can check vitals and make sure nothing else causing vertigo sx.

## 2020-07-31 NOTE — TELEPHONE ENCOUNTER
Pt states she has sx of vertigo again, was on medication for this before, but is not taking it now,  wants to talk to the nurse.

## 2020-07-31 NOTE — TELEPHONE ENCOUNTER
Call from patient. 3 days ago had headache and today feels dizzy-like the room is moving. Worse with sudden movements. No nausea or vomiting. No fever or cough. States had issues with vertigo in the past and was prescribed meclizine (8/22/18). Advise.

## 2020-08-09 ENCOUNTER — PATIENT MESSAGE (OUTPATIENT)
Dept: FAMILY MEDICINE CLINIC | Facility: CLINIC | Age: 40
End: 2020-08-09

## 2020-08-09 DIAGNOSIS — M21.619 BUNION OF GREAT TOE: Primary | ICD-10-CM

## 2020-08-10 NOTE — TELEPHONE ENCOUNTER
From: Lynder Castleman  To: Sarah Lancaster MD  Sent: 8/9/2020 9:45 PM CDT  Subject: Referral Request    Hello. Wondering if I could get a referral for my feet. I have bunions on both feet and looking to see a doctor about them. Thanks!

## 2020-10-13 NOTE — TELEPHONE ENCOUNTER
Overdue for annual px. Please schedule.  Meds will be refilled at [address]

## 2020-10-13 NOTE — TELEPHONE ENCOUNTER
Medication Quantity Refills Start End   ALPRAZolam 0.25 MG Oral Tab 10 tablet 0 7/2/2020      Last OV 5/15/20 LUQ abdominal pain   No future appointments. Please advise.  Thank you

## 2020-10-14 RX ORDER — ALPRAZOLAM 0.25 MG/1
0.25 TABLET ORAL DAILY PRN
Qty: 10 TABLET | Refills: 0 | OUTPATIENT
Start: 2020-10-14

## 2020-10-14 NOTE — TELEPHONE ENCOUNTER
10/13/2020  1:48 PM MATILDE Mendoza RN Patient Medical Advice Request  Appointment     No future appointments. Patient read MCM-nothing scheduled.

## 2020-11-04 ENCOUNTER — OFFICE VISIT (OUTPATIENT)
Dept: FAMILY MEDICINE CLINIC | Facility: CLINIC | Age: 40
End: 2020-11-04
Payer: COMMERCIAL

## 2020-11-04 VITALS
TEMPERATURE: 97 F | RESPIRATION RATE: 18 BRPM | OXYGEN SATURATION: 98 % | HEIGHT: 67 IN | DIASTOLIC BLOOD PRESSURE: 82 MMHG | WEIGHT: 188 LBS | BODY MASS INDEX: 29.51 KG/M2 | HEART RATE: 72 BPM | SYSTOLIC BLOOD PRESSURE: 114 MMHG

## 2020-11-04 DIAGNOSIS — Z79.899 MEDICATION MANAGEMENT: ICD-10-CM

## 2020-11-04 DIAGNOSIS — Z12.31 BREAST CANCER SCREENING BY MAMMOGRAM: Primary | ICD-10-CM

## 2020-11-04 DIAGNOSIS — F32.A ANXIETY AND DEPRESSION: ICD-10-CM

## 2020-11-04 DIAGNOSIS — Z23 NEED FOR VACCINATION: ICD-10-CM

## 2020-11-04 DIAGNOSIS — Z00.00 ANNUAL PHYSICAL EXAM: ICD-10-CM

## 2020-11-04 DIAGNOSIS — Z51.81 THERAPEUTIC DRUG MONITORING: ICD-10-CM

## 2020-11-04 DIAGNOSIS — F41.9 ANXIETY AND DEPRESSION: ICD-10-CM

## 2020-11-04 PROCEDURE — 99072 ADDL SUPL MATRL&STAF TM PHE: CPT | Performed by: FAMILY MEDICINE

## 2020-11-04 PROCEDURE — 99396 PREV VISIT EST AGE 40-64: CPT | Performed by: FAMILY MEDICINE

## 2020-11-04 PROCEDURE — 3008F BODY MASS INDEX DOCD: CPT | Performed by: FAMILY MEDICINE

## 2020-11-04 PROCEDURE — 3079F DIAST BP 80-89 MM HG: CPT | Performed by: FAMILY MEDICINE

## 2020-11-04 PROCEDURE — 90686 IIV4 VACC NO PRSV 0.5 ML IM: CPT | Performed by: FAMILY MEDICINE

## 2020-11-04 PROCEDURE — 90471 IMMUNIZATION ADMIN: CPT | Performed by: FAMILY MEDICINE

## 2020-11-04 PROCEDURE — 3074F SYST BP LT 130 MM HG: CPT | Performed by: FAMILY MEDICINE

## 2020-11-04 RX ORDER — ALPRAZOLAM 0.25 MG/1
0.25 TABLET ORAL DAILY PRN
Qty: 15 TABLET | Refills: 0 | Status: SHIPPED | OUTPATIENT
Start: 2020-11-04 | End: 2021-02-23

## 2020-11-04 NOTE — PROGRESS NOTES
HPI:   Avani Alicia is a 36year old female who presents for a complete physical exam.   No concerns today    Pap UTD  Mammogram: due  Gyne: Dr. Seth Arellano    F/U anxiety and depression  Doing well on current dose of zoloft  Needs refill on xanax.  Freq of Infertility, female     ivf   • Irregular bowel habits    • Menses painful    • Pain with bowel movements    • Polycystic ovarian disease    • Skin cancer, basal cell 2015, 2013      Past Surgical History:   Procedure Laterality Date   • APPENDECTOMY     • controlled. Sleep ok. EXAM:   /82   Pulse 72   Temp 97 °F (36.1 °C) (Temporal)   Resp 18   Ht 67\"   Wt 188 lb (85.3 kg)   LMP 10/31/2020 (Approximate)   SpO2 98%   BMI 29.44 kg/m²   Body mass index is 29.44 kg/m².    GENERAL: well nourished,in no

## 2020-11-12 ENCOUNTER — TELEPHONE (OUTPATIENT)
Dept: FAMILY MEDICINE CLINIC | Facility: CLINIC | Age: 40
End: 2020-11-12

## 2020-11-12 DIAGNOSIS — J02.9 SORE THROAT: Primary | ICD-10-CM

## 2020-11-12 NOTE — TELEPHONE ENCOUNTER
Patient states that she has been feeling nausea since last night and a sore throat today. States the sore throat is barely noticeable. No fever body aches or chills.   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

## 2020-11-12 NOTE — TELEPHONE ENCOUNTER
Patient called and wanted to speak with a RN concerning her nauseousness. She did not know if she should get tested. Ruel Aragon

## 2020-11-12 NOTE — TELEPHONE ENCOUNTER
Monitor sx next 24-48 hours If sx develop, call back for covid test. Self-quarantine in meantime. Supportive measures with salt water gargles, throat drops.

## 2020-11-12 NOTE — TELEPHONE ENCOUNTER
Patient has a new number to call: [phone] Prompt 0.     Her other number is not working she said

## 2020-11-13 NOTE — TELEPHONE ENCOUNTER
Patient advised and verbalized understanding. Contact information for central scheduling given to patient.

## 2020-11-13 NOTE — TELEPHONE ENCOUNTER
Patient still having a sore throat - same as yesterday   Headache - rates pain 4/10  Denies fever  Occasional cough  Denies congestion/runny nose  Patient can taste and smell  Pt denies diarrhea/abdominal pain  Patient has had the same symptoms for over 24

## 2020-11-13 NOTE — TELEPHONE ENCOUNTER
Patient called back today stating that the symptoms are the same as yesterday, HA & ST. Should she get tested for COVID now? Since its been over 24 hours?

## 2020-11-16 ENCOUNTER — APPOINTMENT (OUTPATIENT)
Dept: LAB | Age: 40
End: 2020-11-16
Attending: FAMILY MEDICINE
Payer: COMMERCIAL

## 2020-11-16 DIAGNOSIS — J02.9 SORE THROAT: ICD-10-CM

## 2021-01-04 ENCOUNTER — TELEPHONE (OUTPATIENT)
Dept: FAMILY MEDICINE CLINIC | Facility: CLINIC | Age: 41
End: 2021-01-04

## 2021-01-19 ENCOUNTER — LAB ENCOUNTER (OUTPATIENT)
Dept: LAB | Age: 41
End: 2021-01-19
Attending: INTERNAL MEDICINE
Payer: COMMERCIAL

## 2021-01-19 DIAGNOSIS — Z00.00 ANNUAL PHYSICAL EXAM: ICD-10-CM

## 2021-01-19 DIAGNOSIS — R19.7 DIARRHEA, UNSPECIFIED TYPE: ICD-10-CM

## 2021-01-19 LAB
ALBUMIN SERPL-MCNC: 3.8 G/DL (ref 3.4–5)
ALBUMIN/GLOB SERPL: 1.1 {RATIO} (ref 1–2)
ALP LIVER SERPL-CCNC: 47 U/L
ALT SERPL-CCNC: 21 U/L
ANION GAP SERPL CALC-SCNC: 4 MMOL/L (ref 0–18)
AST SERPL-CCNC: 8 U/L (ref 15–37)
BASOPHILS # BLD AUTO: 0.04 X10(3) UL (ref 0–0.2)
BASOPHILS NFR BLD AUTO: 0.6 %
BILIRUB SERPL-MCNC: 0.4 MG/DL (ref 0.1–2)
BUN BLD-MCNC: 16 MG/DL (ref 7–18)
BUN/CREAT SERPL: 17.8 (ref 10–20)
CALCIUM BLD-MCNC: 9 MG/DL (ref 8.5–10.1)
CHLORIDE SERPL-SCNC: 110 MMOL/L (ref 98–112)
CHOLEST SMN-MCNC: 197 MG/DL (ref ?–200)
CO2 SERPL-SCNC: 27 MMOL/L (ref 21–32)
CREAT BLD-MCNC: 0.9 MG/DL
DEPRECATED RDW RBC AUTO: 45.5 FL (ref 35.1–46.3)
EOSINOPHIL # BLD AUTO: 0.19 X10(3) UL (ref 0–0.7)
EOSINOPHIL NFR BLD AUTO: 2.8 %
ERYTHROCYTE [DISTWIDTH] IN BLOOD BY AUTOMATED COUNT: 13.6 % (ref 11–15)
EST. AVERAGE GLUCOSE BLD GHB EST-MCNC: 100 MG/DL (ref 68–126)
GLOBULIN PLAS-MCNC: 3.4 G/DL (ref 2.8–4.4)
GLUCOSE BLD-MCNC: 89 MG/DL (ref 70–99)
HBA1C MFR BLD HPLC: 5.1 % (ref ?–5.7)
HCT VFR BLD AUTO: 36.7 %
HDLC SERPL-MCNC: 64 MG/DL (ref 40–59)
HGB BLD-MCNC: 12.2 G/DL
IGA SERPL-MCNC: 123 MG/DL (ref 70–312)
IMM GRANULOCYTES # BLD AUTO: 0.02 X10(3) UL (ref 0–1)
IMM GRANULOCYTES NFR BLD: 0.3 %
LDLC SERPL CALC-MCNC: 119 MG/DL (ref ?–100)
LYMPHOCYTES # BLD AUTO: 1.63 X10(3) UL (ref 1–4)
LYMPHOCYTES NFR BLD AUTO: 24 %
M PROTEIN MFR SERPL ELPH: 7.2 G/DL (ref 6.4–8.2)
MCH RBC QN AUTO: 30.4 PG (ref 26–34)
MCHC RBC AUTO-ENTMCNC: 33.2 G/DL (ref 31–37)
MCV RBC AUTO: 91.5 FL
MONOCYTES # BLD AUTO: 0.42 X10(3) UL (ref 0.1–1)
MONOCYTES NFR BLD AUTO: 6.2 %
NEUTROPHILS # BLD AUTO: 4.48 X10 (3) UL (ref 1.5–7.7)
NEUTROPHILS # BLD AUTO: 4.48 X10(3) UL (ref 1.5–7.7)
NEUTROPHILS NFR BLD AUTO: 66.1 %
NONHDLC SERPL-MCNC: 133 MG/DL (ref ?–130)
OSMOLALITY SERPL CALC.SUM OF ELEC: 293 MOSM/KG (ref 275–295)
PATIENT FASTING Y/N/NP: YES
PATIENT FASTING Y/N/NP: YES
PLATELET # BLD AUTO: 290 10(3)UL (ref 150–450)
POTASSIUM SERPL-SCNC: 4.2 MMOL/L (ref 3.5–5.1)
RBC # BLD AUTO: 4.01 X10(6)UL
SODIUM SERPL-SCNC: 141 MMOL/L (ref 136–145)
TRIGL SERPL-MCNC: 72 MG/DL (ref 30–149)
TSI SER-ACNC: 1.63 MIU/ML (ref 0.36–3.74)
VLDLC SERPL CALC-MCNC: 14 MG/DL (ref 0–30)
WBC # BLD AUTO: 6.8 X10(3) UL (ref 4–11)

## 2021-01-19 PROCEDURE — 80053 COMPREHEN METABOLIC PANEL: CPT

## 2021-01-19 PROCEDURE — 85025 COMPLETE CBC W/AUTO DIFF WBC: CPT

## 2021-01-19 PROCEDURE — 83036 HEMOGLOBIN GLYCOSYLATED A1C: CPT

## 2021-01-19 PROCEDURE — 80061 LIPID PANEL: CPT

## 2021-01-19 PROCEDURE — 83516 IMMUNOASSAY NONANTIBODY: CPT

## 2021-01-19 PROCEDURE — 82784 ASSAY IGA/IGD/IGG/IGM EACH: CPT

## 2021-01-19 PROCEDURE — 84443 ASSAY THYROID STIM HORMONE: CPT

## 2021-01-19 PROCEDURE — 36415 COLL VENOUS BLD VENIPUNCTURE: CPT

## 2021-01-20 ENCOUNTER — HOSPITAL ENCOUNTER (OUTPATIENT)
Dept: MAMMOGRAPHY | Age: 41
Discharge: HOME | End: 2021-01-20
Attending: FAMILY MEDICINE
Payer: COMMERCIAL

## 2021-01-20 DIAGNOSIS — Z12.31 BREAST CANCER SCREENING BY MAMMOGRAM: ICD-10-CM

## 2021-01-20 PROCEDURE — 77067 SCR MAMMO BI INCL CAD: CPT | Performed by: FAMILY MEDICINE

## 2021-01-22 LAB — TTG IGA SER-ACNC: 2 U/ML (ref ?–7)

## 2021-02-05 ENCOUNTER — PATIENT MESSAGE (OUTPATIENT)
Dept: FAMILY MEDICINE CLINIC | Facility: CLINIC | Age: 41
End: 2021-02-05

## 2021-02-05 NOTE — TELEPHONE ENCOUNTER
Mammo results:  FINDINGS:  No suspicious microcalcifications, asymmetry, mass or architectural distortion. Benign punctate calcifications. Please advise.

## 2021-02-05 NOTE — TELEPHONE ENCOUNTER
From: Dev Nguyen  To: Aixa Ashton MD  Sent: 2/5/2021 9:35 AM CST  Subject: Test Results Question    It appears my mammogram was fine from what I am reading.  Just want to make sure as I have pain on the left side of my left breast (which I had when

## 2021-02-16 ENCOUNTER — TELEPHONE (OUTPATIENT)
Dept: FAMILY MEDICINE CLINIC | Facility: CLINIC | Age: 41
End: 2021-02-16

## 2021-02-16 ENCOUNTER — HOSPITAL ENCOUNTER (OUTPATIENT)
Age: 41
Discharge: HOME | End: 2021-02-16
Payer: COMMERCIAL

## 2021-02-16 ENCOUNTER — APPOINTMENT (OUTPATIENT)
Dept: CT IMAGING | Age: 41
End: 2021-02-16
Attending: NURSE PRACTITIONER
Payer: COMMERCIAL

## 2021-02-16 VITALS
RESPIRATION RATE: 15 BRPM | SYSTOLIC BLOOD PRESSURE: 112 MMHG | OXYGEN SATURATION: 99 % | TEMPERATURE: 97 F | DIASTOLIC BLOOD PRESSURE: 66 MMHG | HEART RATE: 89 BPM

## 2021-02-16 DIAGNOSIS — R10.9 ABDOMINAL PAIN OF UNKNOWN ETIOLOGY: ICD-10-CM

## 2021-02-16 DIAGNOSIS — R31.29 OTHER MICROSCOPIC HEMATURIA: Primary | ICD-10-CM

## 2021-02-16 LAB
POCT BILIRUBIN URINE: NEGATIVE
POCT GLUCOSE URINE: NEGATIVE MG/DL
POCT KETONE URINE: NEGATIVE MG/DL
POCT NITRITE URINE: NEGATIVE
POCT PH URINE: 7 (ref 5–8)
POCT PROTEIN URINE: NEGATIVE MG/DL
POCT SPECIFIC GRAVITY URINE: 1.01
POCT URINE CLARITY: CLEAR
POCT URINE PREGNANCY: NEGATIVE
POCT UROBILINOGEN URINE: 0.2 MG/DL

## 2021-02-16 PROCEDURE — 81002 URINALYSIS NONAUTO W/O SCOPE: CPT | Performed by: NURSE PRACTITIONER

## 2021-02-16 PROCEDURE — 87086 URINE CULTURE/COLONY COUNT: CPT | Performed by: NURSE PRACTITIONER

## 2021-02-16 PROCEDURE — 99213 OFFICE O/P EST LOW 20 MIN: CPT | Performed by: NURSE PRACTITIONER

## 2021-02-16 PROCEDURE — 74176 CT ABD & PELVIS W/O CONTRAST: CPT | Performed by: NURSE PRACTITIONER

## 2021-02-16 PROCEDURE — 81025 URINE PREGNANCY TEST: CPT | Performed by: NURSE PRACTITIONER

## 2021-02-16 RX ORDER — KETOROLAC TROMETHAMINE 30 MG/ML
30 INJECTION, SOLUTION INTRAMUSCULAR; INTRAVENOUS ONCE
Status: DISCONTINUED | OUTPATIENT
Start: 2021-02-16 | End: 2021-02-16

## 2021-02-16 RX ORDER — SODIUM CHLORIDE 9 MG/ML
1000 INJECTION, SOLUTION INTRAVENOUS ONCE
Status: DISCONTINUED | OUTPATIENT
Start: 2021-02-16 | End: 2021-02-16

## 2021-02-16 NOTE — ED INITIAL ASSESSMENT (HPI)
Patient states she had left flank pain 2 days ago that resolved. C/O intermittent vaginal pain today. Denies pain at this time. Denies any urinary symptoms. Currently has her menses.

## 2021-02-16 NOTE — TELEPHONE ENCOUNTER
Call from patient. Hx of kidney stones. Has never passed one before. Had surgery before to remove some. Pain near vaginal area, sharp pain. No pain with urination, or burning. Staying hydrated.   Pain doesn't radiate anywhere  3 days ago had bad back pa

## 2021-02-16 NOTE — ED PROVIDER NOTES
Patient Seen in: [facility]      History   Patient presents with:  Flank Pain    Stated Complaint: Abdominal Pain    HPI/Subjective:   42-year-old female presents with complaints of pelvic pain.   States started with left flank pain over the la OR   • HERNIA SURGERY     • OTHER SURGICAL HISTORY      left ovary removed for dermoid cyst   • OTHER SURGICAL HISTORY      b/l inguinal hernia                Social History    Tobacco Use      Smoking status: Current Some Day Smoker        Packs/day: 0.00 following components:       Result Value    Blood, Urine Large (*)     Leukocyte esterase urine Trace (*)     All other components within normal limits   POCT PREGNANCY, URINE - Normal   POCT CBC   URINE CULTURE, ROUTINE         Jimbo Screening Bilat (cpt=77 rendering are generated on the CT scanner workstation to localize potential stones in the cranio-caudal plane. Dose reduction techniques were used.  Dose information is transmitted to the ACR ([employer]) Jovita Sosa [address] [facility] Radiology Data Re stones as well as ovarian cyst.  CT scan was done of the abdomen pelvis without contrast to rule out stone. No kidney stone was appreciated on exam.  No other inflammatory or acute process was noted per radiology.   Urine was obtained and urine dip was don

## 2021-02-16 NOTE — TELEPHONE ENCOUNTER
Pt has a Kidney stone and is having some pain.  Pt isn't sure if she is passing the stone or if its normal

## 2021-02-23 RX ORDER — ALPRAZOLAM 0.25 MG/1
0.25 TABLET ORAL DAILY PRN
Qty: 15 TABLET | Refills: 0 | Status: SHIPPED | OUTPATIENT
Start: 2021-02-23 | End: 2021-05-28

## 2021-02-23 NOTE — TELEPHONE ENCOUNTER
Last refilled on 11/4/20 for # 15 with 0 refills  Last OV 11/4/20  No future appointments. Thank you.

## 2021-03-18 ENCOUNTER — OFFICE VISIT (OUTPATIENT)
Dept: FAMILY MEDICINE CLINIC | Facility: CLINIC | Age: 41
End: 2021-03-18
Payer: COMMERCIAL

## 2021-03-18 ENCOUNTER — PATIENT MESSAGE (OUTPATIENT)
Dept: FAMILY MEDICINE CLINIC | Facility: CLINIC | Age: 41
End: 2021-03-18

## 2021-03-18 VITALS
DIASTOLIC BLOOD PRESSURE: 62 MMHG | HEIGHT: 67 IN | HEART RATE: 73 BPM | RESPIRATION RATE: 14 BRPM | BODY MASS INDEX: 30.29 KG/M2 | TEMPERATURE: 97 F | OXYGEN SATURATION: 98 % | SYSTOLIC BLOOD PRESSURE: 118 MMHG | WEIGHT: 193 LBS

## 2021-03-18 DIAGNOSIS — M79.602 ARM PAIN, ANTERIOR, LEFT: Primary | ICD-10-CM

## 2021-03-18 PROCEDURE — 3074F SYST BP LT 130 MM HG: CPT | Performed by: NURSE PRACTITIONER

## 2021-03-18 PROCEDURE — 3008F BODY MASS INDEX DOCD: CPT | Performed by: NURSE PRACTITIONER

## 2021-03-18 PROCEDURE — 3078F DIAST BP <80 MM HG: CPT | Performed by: NURSE PRACTITIONER

## 2021-03-18 PROCEDURE — 99213 OFFICE O/P EST LOW 20 MIN: CPT | Performed by: NURSE PRACTITIONER

## 2021-03-18 NOTE — PROGRESS NOTES
HPI: HPI   Patient is here for left arm discomfort. She was in IC on 2/16/21 for abdominal pain. The abdominal pain has resolved. She needed blood work at [address] [place] [address]. She is a difficult blood draw.  She had significant bruising with the blood draw, more than norm Breast Cancer Paternal Grandmother    • Breast Cancer Maternal Grandmother    • Diabetes Neg       Social History    Tobacco Use      Smoking status: Current Some Day Smoker        Packs/day: 0.00        Years: 0.00        Pack years: 0      Smokeless toba

## 2021-05-28 RX ORDER — ALPRAZOLAM 0.25 MG/1
0.25 TABLET ORAL DAILY PRN
Qty: 15 TABLET | Refills: 0 | Status: SHIPPED | OUTPATIENT
Start: 2021-05-28 | End: 2021-11-02

## 2021-05-28 NOTE — TELEPHONE ENCOUNTER
LOV: 3/18/21 for arm pain    ALPRAZolam 0.25 MG Oral Tab 15 tablet 0 2/23/2021    Sig:   Take 1 tablet (0.25 mg total) by mouth daily as needed for Anxiety. No future appointments.   Please advise

## 2021-08-26 ENCOUNTER — HOSPITAL ENCOUNTER (OUTPATIENT)
Dept: LAB | Age: 41
Discharge: HOME | End: 2021-08-26
Attending: OBSTETRICS & GYNECOLOGY

## 2021-08-26 DIAGNOSIS — Z01.812 PRE-PROCEDURAL LABORATORY EXAMINATION: Primary | ICD-10-CM

## 2021-08-26 LAB
ALBUMIN SERPL-MCNC: 4.3 G/DL (ref 3.6–5.1)
ALBUMIN/GLOB SERPL: 1.4 {RATIO} (ref 1–2.4)
ALP SERPL-CCNC: 52 UNITS/L (ref 45–117)
ALT SERPL-CCNC: 31 UNITS/L
ANION GAP SERPL CALC-SCNC: 12 MMOL/L (ref 10–20)
AST SERPL-CCNC: 16 UNITS/L
BASOPHILS # BLD: 0 K/MCL (ref 0–0.3)
BASOPHILS NFR BLD: 1 %
BILIRUB SERPL-MCNC: 0.6 MG/DL (ref 0.2–1)
BUN SERPL-MCNC: 15 MG/DL (ref 6–20)
BUN/CREAT SERPL: 18 (ref 7–25)
CALCIUM SERPL-MCNC: 9.3 MG/DL (ref 8.4–10.2)
CHLORIDE SERPL-SCNC: 105 MMOL/L (ref 98–107)
CO2 SERPL-SCNC: 26 MMOL/L (ref 21–32)
CREAT SERPL-MCNC: 0.82 MG/DL (ref 0.51–0.95)
DEPRECATED RDW RBC: 45.7 FL (ref 39–50)
EOSINOPHIL # BLD: 0.1 K/MCL (ref 0–0.5)
EOSINOPHIL NFR BLD: 2 %
ERYTHROCYTE [DISTWIDTH] IN BLOOD: 13.2 % (ref 11–15)
FASTING DURATION TIME PATIENT: NORMAL H
GFR SERPLBLD BASED ON 1.73 SQ M-ARVRAT: 90 ML/MIN
GLOBULIN SER-MCNC: 3 G/DL (ref 2–4)
GLUCOSE SERPL-MCNC: 85 MG/DL (ref 65–99)
HBV CORE IGM SER QL: NEGATIVE
HBV SURFACE AB SER QL: NEGATIVE
HCG SERPL-ACNC: <2 MUNITS/ML
HCT VFR BLD CALC: 36.8 % (ref 36–46.5)
HGB BLD-MCNC: 11.7 G/DL (ref 12–15.5)
HIV 1+2 AB+HIV1 P24 AG SERPL QL IA: NONREACTIVE
IMM GRANULOCYTES # BLD AUTO: 0 K/MCL (ref 0–0.2)
IMM GRANULOCYTES # BLD: 0 %
LYMPHOCYTES # BLD: 1.9 K/MCL (ref 1–4.8)
LYMPHOCYTES NFR BLD: 30 %
MCH RBC QN AUTO: 30.5 PG (ref 26–34)
MCHC RBC AUTO-ENTMCNC: 31.8 G/DL (ref 32–36.5)
MCV RBC AUTO: 95.8 FL (ref 78–100)
MONOCYTES # BLD: 0.4 K/MCL (ref 0.3–0.9)
MONOCYTES NFR BLD: 7 %
NEUTROPHILS # BLD: 3.7 K/MCL (ref 1.8–7.7)
NEUTROPHILS NFR BLD: 60 %
NRBC BLD MANUAL-RTO: 0 /100 WBC
PLATELET # BLD AUTO: 334 K/MCL (ref 140–450)
POTASSIUM SERPL-SCNC: 4.3 MMOL/L (ref 3.4–5.1)
PROT SERPL-MCNC: 7.3 G/DL (ref 6.4–8.2)
RBC # BLD: 3.84 MIL/MCL (ref 4–5.2)
SODIUM SERPL-SCNC: 139 MMOL/L (ref 135–145)
WBC # BLD: 6.1 K/MCL (ref 4.2–11)

## 2021-08-26 PROCEDURE — 86706 HEP B SURFACE ANTIBODY: CPT | Performed by: OBSTETRICS & GYNECOLOGY

## 2021-08-26 PROCEDURE — 84702 CHORIONIC GONADOTROPIN TEST: CPT | Performed by: OBSTETRICS & GYNECOLOGY

## 2021-08-26 PROCEDURE — 86705 HEP B CORE ANTIBODY IGM: CPT | Performed by: OBSTETRICS & GYNECOLOGY

## 2021-08-26 PROCEDURE — 36415 COLL VENOUS BLD VENIPUNCTURE: CPT | Performed by: OBSTETRICS & GYNECOLOGY

## 2021-08-26 PROCEDURE — 80053 COMPREHEN METABOLIC PANEL: CPT | Performed by: OBSTETRICS & GYNECOLOGY

## 2021-08-26 PROCEDURE — 85025 COMPLETE CBC W/AUTO DIFF WBC: CPT | Performed by: OBSTETRICS & GYNECOLOGY

## 2021-08-26 PROCEDURE — 87389 HIV-1 AG W/HIV-1&-2 AB AG IA: CPT | Performed by: OBSTETRICS & GYNECOLOGY

## 2021-08-29 ENCOUNTER — LAB SERVICES (OUTPATIENT)
Dept: LAB | Age: 41
End: 2021-08-29

## 2021-08-29 DIAGNOSIS — Z01.812 PRE-PROCEDURAL LABORATORY EXAMINATION: Primary | ICD-10-CM

## 2021-08-29 LAB
SARS-COV-2 RNA RESP QL NAA+PROBE: NOT DETECTED
SERVICE CMNT-IMP: NORMAL
SERVICE CMNT-IMP: NORMAL

## 2021-08-29 PROCEDURE — U0003 INFECTIOUS AGENT DETECTION BY NUCLEIC ACID (DNA OR RNA); SEVERE ACUTE RESPIRATORY SYNDROME CORONAVIRUS 2 (SARS-COV-2) (CORONAVIRUS DISEASE [COVID-19]), AMPLIFIED PROBE TECHNIQUE, MAKING USE OF HIGH THROUGHPUT TECHNOLOGIES AS DESCRIBED BY CMS-2020-01-R: HCPCS | Performed by: OBSTETRICS & GYNECOLOGY

## 2021-08-29 PROCEDURE — U0005 INFEC AGEN DETEC AMPLI PROBE: HCPCS | Performed by: OBSTETRICS & GYNECOLOGY

## 2021-08-31 ASSESSMENT — ACTIVITIES OF DAILY LIVING (ADL)
RECENT_DECLINE_ADL: NO
ADL_BEFORE_ADMISSION: INDEPENDENT
ADL_SCORE: 12
NEEDS_ASSIST: NO
CHRONIC_PAIN_PRESENT: NO
HISTORY OF FALLING IN THE LAST YEAR (PRIOR TO ADMISSION): NO
ADL_SHORT_OF_BREATH: NO

## 2021-08-31 ASSESSMENT — COGNITIVE AND FUNCTIONAL STATUS - GENERAL
ARE YOU DEAF OR DO YOU HAVE SERIOUS DIFFICULTY  HEARING: NO
ARE YOU BLIND OR DO YOU HAVE SERIOUS DIFFICULTY SEEING, EVEN WHEN WEARING GLASSES: NO

## 2021-09-01 ENCOUNTER — ANESTHESIA (OUTPATIENT)
Dept: SURGERY | Age: 41
End: 2021-09-01

## 2021-09-01 ENCOUNTER — HOSPITAL ENCOUNTER (OUTPATIENT)
Age: 41
Discharge: HOME | End: 2021-09-01
Attending: OBSTETRICS & GYNECOLOGY | Admitting: OBSTETRICS & GYNECOLOGY

## 2021-09-01 ENCOUNTER — ANESTHESIA EVENT (OUTPATIENT)
Dept: SURGERY | Age: 41
End: 2021-09-01

## 2021-09-01 DIAGNOSIS — Z90.710 S/P HYSTERECTOMY: Primary | ICD-10-CM

## 2021-09-01 PROCEDURE — C1765 ADHESION BARRIER: HCPCS | Performed by: OBSTETRICS & GYNECOLOGY

## 2021-09-01 PROCEDURE — 10006027 HB SUPPLY 278: Performed by: OBSTETRICS & GYNECOLOGY

## 2021-09-01 PROCEDURE — 10002800 HB RX 250 W HCPCS: Performed by: NURSE ANESTHETIST, CERTIFIED REGISTERED

## 2021-09-01 PROCEDURE — 13000001 HB PHASE II RECOVERY EA 30 MINUTES: Performed by: OBSTETRICS & GYNECOLOGY

## 2021-09-01 PROCEDURE — 10004452 HB PACU ADDL 30 MINUTES: Performed by: OBSTETRICS & GYNECOLOGY

## 2021-09-01 PROCEDURE — 10004451 HB PACU RECOVERY 1ST 30 MINUTES: Performed by: OBSTETRICS & GYNECOLOGY

## 2021-09-01 PROCEDURE — 10002803 HB RX 637: Performed by: NURSE ANESTHETIST, CERTIFIED REGISTERED

## 2021-09-01 PROCEDURE — 10002807 HB RX 258: Performed by: OBSTETRICS & GYNECOLOGY

## 2021-09-01 PROCEDURE — 10002807 HB RX 258: Performed by: NURSE ANESTHETIST, CERTIFIED REGISTERED

## 2021-09-01 PROCEDURE — 10002801 HB RX 250 W/O HCPCS: Performed by: NURSE ANESTHETIST, CERTIFIED REGISTERED

## 2021-09-01 PROCEDURE — 58662 LAPAROSCOPY EXCISE LESIONS: CPT | Performed by: OBSTETRICS & GYNECOLOGY

## 2021-09-01 PROCEDURE — 13000003 HB ANESTHESIA  GENERAL EA ADD MINUTE: Performed by: OBSTETRICS & GYNECOLOGY

## 2021-09-01 PROCEDURE — 10002803 HB RX 637: Performed by: STUDENT IN AN ORGANIZED HEALTH CARE EDUCATION/TRAINING PROGRAM

## 2021-09-01 PROCEDURE — 10002800 HB RX 250 W HCPCS: Performed by: OBSTETRICS & GYNECOLOGY

## 2021-09-01 PROCEDURE — 88307 TISSUE EXAM BY PATHOLOGIST: CPT | Performed by: OBSTETRICS & GYNECOLOGY

## 2021-09-01 PROCEDURE — 10002801 HB RX 250 W/O HCPCS: Performed by: OBSTETRICS & GYNECOLOGY

## 2021-09-01 PROCEDURE — 13000002 HB ANESTHESIA  GENERAL  S/U + 1ST 15 MIN: Performed by: OBSTETRICS & GYNECOLOGY

## 2021-09-01 PROCEDURE — 10006023 HB SUPPLY 272: Performed by: OBSTETRICS & GYNECOLOGY

## 2021-09-01 PROCEDURE — 50715 RELEASE OF URETER: CPT | Performed by: OBSTETRICS & GYNECOLOGY

## 2021-09-01 PROCEDURE — 58571 TLH W/T/O 250 G OR LESS: CPT | Performed by: OBSTETRICS & GYNECOLOGY

## 2021-09-01 PROCEDURE — 13000039 HB MAJOR COMPLEX CASE EA ADD MINUTE: Performed by: OBSTETRICS & GYNECOLOGY

## 2021-09-01 PROCEDURE — 13000038 HB MAJOR COMPLEX CASE S/U + 1ST 15 MIN: Performed by: OBSTETRICS & GYNECOLOGY

## 2021-09-01 DEVICE — ABSORBABLE ADHESION BARRIER
Type: IMPLANTABLE DEVICE | Site: ABDOMEN | Status: FUNCTIONAL
Brand: GYNECARE INTERCEED

## 2021-09-01 RX ORDER — ONDANSETRON 2 MG/ML
INJECTION INTRAMUSCULAR; INTRAVENOUS PRN
Status: DISCONTINUED | OUTPATIENT
Start: 2021-09-01 | End: 2021-09-01

## 2021-09-01 RX ORDER — CHLORHEXIDINE GLUCONATE ORAL RINSE 1.2 MG/ML
15 SOLUTION DENTAL ONCE
Status: COMPLETED | OUTPATIENT
Start: 2021-09-01 | End: 2021-09-01

## 2021-09-01 RX ORDER — EPHEDRINE SULFATE/0.9% NACL/PF 50 MG/10ML
SYRINGE (ML) INTRAVENOUS PRN
Status: DISCONTINUED | OUTPATIENT
Start: 2021-09-01 | End: 2021-09-01

## 2021-09-01 RX ORDER — HYDRALAZINE HYDROCHLORIDE 20 MG/ML
5 INJECTION INTRAMUSCULAR; INTRAVENOUS EVERY 10 MIN PRN
Status: DISCONTINUED | OUTPATIENT
Start: 2021-09-01 | End: 2021-09-01 | Stop reason: HOSPADM

## 2021-09-01 RX ORDER — SODIUM CHLORIDE 9 MG/ML
INJECTION, SOLUTION INTRAVENOUS CONTINUOUS
Status: DISCONTINUED | OUTPATIENT
Start: 2021-09-01 | End: 2021-09-01 | Stop reason: HOSPADM

## 2021-09-01 RX ORDER — HYDROCODONE BITARTRATE AND ACETAMINOPHEN 5; 325 MG/1; MG/1
1 TABLET ORAL ONCE
Status: COMPLETED | OUTPATIENT
Start: 2021-09-01 | End: 2021-09-01

## 2021-09-01 RX ORDER — ROCURONIUM BROMIDE 10 MG/ML
INJECTION, SOLUTION INTRAVENOUS PRN
Status: DISCONTINUED | OUTPATIENT
Start: 2021-09-01 | End: 2021-09-01

## 2021-09-01 RX ORDER — DEXTROSE MONOHYDRATE 50 MG/ML
INJECTION, SOLUTION INTRAVENOUS CONTINUOUS PRN
Status: DISCONTINUED | OUTPATIENT
Start: 2021-09-01 | End: 2021-09-01 | Stop reason: HOSPADM

## 2021-09-01 RX ORDER — SODIUM CHLORIDE, SODIUM LACTATE, POTASSIUM CHLORIDE, CALCIUM CHLORIDE 600; 310; 30; 20 MG/100ML; MG/100ML; MG/100ML; MG/100ML
INJECTION, SOLUTION INTRAVENOUS CONTINUOUS PRN
Status: DISCONTINUED | OUTPATIENT
Start: 2021-09-01 | End: 2021-09-01

## 2021-09-01 RX ORDER — IBUPROFEN 600 MG/1
600 TABLET ORAL EVERY 6 HOURS PRN
Qty: 100 TABLET | Refills: 1 | Status: SHIPPED | OUTPATIENT
Start: 2021-09-01

## 2021-09-01 RX ORDER — DOCUSATE SODIUM 100 MG/1
100 CAPSULE, LIQUID FILLED ORAL 2 TIMES DAILY
Qty: 100 CAPSULE | Refills: 1 | Status: SHIPPED | OUTPATIENT
Start: 2021-09-01

## 2021-09-01 RX ORDER — SCOLOPAMINE TRANSDERMAL SYSTEM 1 MG/1
1 PATCH, EXTENDED RELEASE TRANSDERMAL ONCE
Status: DISCONTINUED | OUTPATIENT
Start: 2021-09-01 | End: 2021-09-01 | Stop reason: HOSPADM

## 2021-09-01 RX ORDER — MIDAZOLAM HYDROCHLORIDE 1 MG/ML
2 INJECTION, SOLUTION INTRAMUSCULAR; INTRAVENOUS
Status: COMPLETED | OUTPATIENT
Start: 2021-09-01 | End: 2021-09-01

## 2021-09-01 RX ORDER — IBUPROFEN 200 MG
600 TABLET ORAL EVERY 6 HOURS PRN
Status: DISCONTINUED | OUTPATIENT
Start: 2021-09-01 | End: 2021-09-01 | Stop reason: HOSPADM

## 2021-09-01 RX ORDER — LIDOCAINE HYDROCHLORIDE 10 MG/ML
5-10 INJECTION, SOLUTION INFILTRATION; PERINEURAL PRN
Status: DISCONTINUED | OUTPATIENT
Start: 2021-09-01 | End: 2021-09-01 | Stop reason: HOSPADM

## 2021-09-01 RX ORDER — DEXAMETHASONE SODIUM PHOSPHATE 4 MG/ML
INJECTION, SOLUTION INTRA-ARTICULAR; INTRALESIONAL; INTRAMUSCULAR; INTRAVENOUS; SOFT TISSUE PRN
Status: DISCONTINUED | OUTPATIENT
Start: 2021-09-01 | End: 2021-09-01

## 2021-09-01 RX ORDER — 0.9 % SODIUM CHLORIDE 0.9 %
2 VIAL (ML) INJECTION EVERY 12 HOURS SCHEDULED
Status: DISCONTINUED | OUTPATIENT
Start: 2021-09-01 | End: 2021-09-01 | Stop reason: HOSPADM

## 2021-09-01 RX ORDER — ACETAMINOPHEN 325 MG/1
650 TABLET ORAL EVERY 4 HOURS PRN
Qty: 100 TABLET | Refills: 1 | Status: SHIPPED | OUTPATIENT
Start: 2021-09-01

## 2021-09-01 RX ORDER — PROPOFOL 10 MG/ML
INJECTION, EMULSION INTRAVENOUS PRN
Status: DISCONTINUED | OUTPATIENT
Start: 2021-09-01 | End: 2021-09-01

## 2021-09-01 RX ORDER — ONDANSETRON 8 MG/1
8 TABLET, ORALLY DISINTEGRATING ORAL EVERY 8 HOURS PRN
Qty: 10 TABLET | Refills: 0 | Status: SHIPPED | OUTPATIENT
Start: 2021-09-01

## 2021-09-01 RX ORDER — SODIUM CHLORIDE, SODIUM LACTATE, POTASSIUM CHLORIDE, CALCIUM CHLORIDE 600; 310; 30; 20 MG/100ML; MG/100ML; MG/100ML; MG/100ML
INJECTION, SOLUTION INTRAVENOUS CONTINUOUS
Status: DISCONTINUED | OUTPATIENT
Start: 2021-09-01 | End: 2021-09-01 | Stop reason: HOSPADM

## 2021-09-01 RX ORDER — LIDOCAINE HYDROCHLORIDE 10 MG/ML
INJECTION, SOLUTION INFILTRATION; PERINEURAL PRN
Status: DISCONTINUED | OUTPATIENT
Start: 2021-09-01 | End: 2021-09-01

## 2021-09-01 RX ORDER — NICOTINE POLACRILEX 4 MG
30 LOZENGE BUCCAL
Status: DISCONTINUED | OUTPATIENT
Start: 2021-09-01 | End: 2021-09-01 | Stop reason: HOSPADM

## 2021-09-01 RX ORDER — ONDANSETRON 2 MG/ML
4 INJECTION INTRAMUSCULAR; INTRAVENOUS 2 TIMES DAILY PRN
Status: DISCONTINUED | OUTPATIENT
Start: 2021-09-01 | End: 2021-09-01 | Stop reason: HOSPADM

## 2021-09-01 RX ORDER — HUMAN INSULIN 100 [IU]/ML
INJECTION, SOLUTION SUBCUTANEOUS
Status: DISCONTINUED | OUTPATIENT
Start: 2021-09-01 | End: 2021-09-01 | Stop reason: HOSPADM

## 2021-09-01 RX ORDER — BUPIVACAINE HYDROCHLORIDE 5 MG/ML
INJECTION, SOLUTION EPIDURAL; INTRACAUDAL PRN
Status: DISCONTINUED | OUTPATIENT
Start: 2021-09-01 | End: 2021-09-01 | Stop reason: HOSPADM

## 2021-09-01 RX ORDER — PHENYLEPHRINE HYDROCHLORIDE 10 MG/ML
INJECTION, SOLUTION INTRAMUSCULAR; INTRAVENOUS; SUBCUTANEOUS PRN
Status: DISCONTINUED | OUTPATIENT
Start: 2021-09-01 | End: 2021-09-01

## 2021-09-01 RX ORDER — MAGNESIUM HYDROXIDE 1200 MG/15ML
LIQUID ORAL PRN
Status: DISCONTINUED | OUTPATIENT
Start: 2021-09-01 | End: 2021-09-01 | Stop reason: HOSPADM

## 2021-09-01 RX ORDER — HYDROCODONE BITARTRATE AND ACETAMINOPHEN 5; 325 MG/1; MG/1
1 TABLET ORAL EVERY 6 HOURS PRN
Qty: 20 TABLET | Refills: 0 | Status: SHIPPED | OUTPATIENT
Start: 2021-09-01

## 2021-09-01 RX ORDER — DEXTROSE MONOHYDRATE 25 G/50ML
25 INJECTION, SOLUTION INTRAVENOUS PRN
Status: DISCONTINUED | OUTPATIENT
Start: 2021-09-01 | End: 2021-09-01 | Stop reason: HOSPADM

## 2021-09-01 RX ORDER — PHENAZOPYRIDINE HYDROCHLORIDE 200 MG/1
200 TABLET, FILM COATED ORAL ONCE
Status: COMPLETED | OUTPATIENT
Start: 2021-09-01 | End: 2021-09-01

## 2021-09-01 RX ADMIN — FENTANYL CITRATE 25 MCG: 50 INJECTION INTRAMUSCULAR; INTRAVENOUS at 15:10

## 2021-09-01 RX ADMIN — SODIUM CHLORIDE, POTASSIUM CHLORIDE, SODIUM LACTATE AND CALCIUM CHLORIDE: 600; 310; 30; 20 INJECTION, SOLUTION INTRAVENOUS at 10:26

## 2021-09-01 RX ADMIN — FENTANYL CITRATE 50 MCG: 50 INJECTION, SOLUTION INTRAMUSCULAR; INTRAVENOUS at 11:36

## 2021-09-01 RX ADMIN — FENTANYL CITRATE 25 MCG: 50 INJECTION INTRAMUSCULAR; INTRAVENOUS at 15:40

## 2021-09-01 RX ADMIN — HYDROCODONE BITARTRATE AND ACETAMINOPHEN 1 TABLET: 5; 325 TABLET ORAL at 18:14

## 2021-09-01 RX ADMIN — PHENYLEPHRINE HYDROCHLORIDE 100 MCG: 10 INJECTION INTRAVENOUS at 12:04

## 2021-09-01 RX ADMIN — FENTANYL CITRATE 50 MCG: 50 INJECTION, SOLUTION INTRAMUSCULAR; INTRAVENOUS at 12:28

## 2021-09-01 RX ADMIN — ONDANSETRON 4 MG: 2 INJECTION INTRAMUSCULAR; INTRAVENOUS at 14:56

## 2021-09-01 RX ADMIN — DEXAMETHASONE SODIUM PHOSPHATE 8 MG: 4 INJECTION, SOLUTION INTRAMUSCULAR; INTRAVENOUS at 11:48

## 2021-09-01 RX ADMIN — EPHEDRINE SULFATE 10 MG: 5 INJECTION INTRAVENOUS at 14:42

## 2021-09-01 RX ADMIN — EPHEDRINE SULFATE 10 MG: 5 INJECTION INTRAVENOUS at 14:39

## 2021-09-01 RX ADMIN — CHLORHEXIDINE GLUCONATE 15 ML: 1.2 RINSE ORAL at 10:42

## 2021-09-01 RX ADMIN — Medication 30 MG: at 14:56

## 2021-09-01 RX ADMIN — FENTANYL CITRATE 25 MCG: 50 INJECTION INTRAMUSCULAR; INTRAVENOUS at 15:05

## 2021-09-01 RX ADMIN — SODIUM CHLORIDE, POTASSIUM CHLORIDE, SODIUM LACTATE AND CALCIUM CHLORIDE: 600; 310; 30; 20 INJECTION, SOLUTION INTRAVENOUS at 11:31

## 2021-09-01 RX ADMIN — SODIUM BICARBONATE 1 ML: 84 INJECTION, SOLUTION INTRAVENOUS at 10:43

## 2021-09-01 RX ADMIN — FENTANYL CITRATE 25 MCG: 50 INJECTION INTRAMUSCULAR; INTRAVENOUS at 15:20

## 2021-09-01 RX ADMIN — FENTANYL CITRATE 25 MCG: 50 INJECTION INTRAMUSCULAR; INTRAVENOUS at 15:25

## 2021-09-01 RX ADMIN — ROCURONIUM BROMIDE 20 MG: 50 INJECTION, SOLUTION INTRAVENOUS at 12:28

## 2021-09-01 RX ADMIN — MIDAZOLAM HYDROCHLORIDE 2 MG: 1 INJECTION, SOLUTION INTRAMUSCULAR; INTRAVENOUS at 11:25

## 2021-09-01 RX ADMIN — PHENYLEPHRINE HYDROCHLORIDE 100 MCG: 10 INJECTION INTRAVENOUS at 12:15

## 2021-09-01 RX ADMIN — SCOPOLAMINE 1 PATCH: 1 PATCH TRANSDERMAL at 10:43

## 2021-09-01 RX ADMIN — ROCURONIUM BROMIDE 10 MG: 50 INJECTION, SOLUTION INTRAVENOUS at 12:20

## 2021-09-01 RX ADMIN — CEFAZOLIN SODIUM 2000 MG: 300 INJECTION, POWDER, LYOPHILIZED, FOR SOLUTION INTRAVENOUS at 11:50

## 2021-09-01 RX ADMIN — EPHEDRINE SULFATE 10 MG: 5 INJECTION INTRAVENOUS at 12:16

## 2021-09-01 RX ADMIN — PROPOFOL 200 MG: 10 INJECTION, EMULSION INTRAVENOUS at 11:36

## 2021-09-01 RX ADMIN — EPHEDRINE SULFATE 10 MG: 5 INJECTION INTRAVENOUS at 14:35

## 2021-09-01 RX ADMIN — ROCURONIUM BROMIDE 20 MG: 50 INJECTION, SOLUTION INTRAVENOUS at 13:41

## 2021-09-01 RX ADMIN — PHENAZOPYRIDINE HYDROCHLORIDE 200 MG: 200 TABLET ORAL at 10:18

## 2021-09-01 RX ADMIN — HYDROMORPHONE HYDROCHLORIDE 0.4 MG: 1 INJECTION, SOLUTION INTRAMUSCULAR; INTRAVENOUS; SUBCUTANEOUS at 16:05

## 2021-09-01 RX ADMIN — LIDOCAINE HYDROCHLORIDE 50 MG: 10 INJECTION, SOLUTION INFILTRATION; PERINEURAL at 11:36

## 2021-09-01 RX ADMIN — EPHEDRINE SULFATE 10 MG: 5 INJECTION INTRAVENOUS at 12:19

## 2021-09-01 RX ADMIN — FENTANYL CITRATE 25 MCG: 50 INJECTION INTRAMUSCULAR; INTRAVENOUS at 15:35

## 2021-09-01 RX ADMIN — ROCURONIUM BROMIDE 40 MG: 50 INJECTION, SOLUTION INTRAVENOUS at 11:36

## 2021-09-01 RX ADMIN — CEFAZOLIN SODIUM 2000 MG: 300 INJECTION, POWDER, LYOPHILIZED, FOR SOLUTION INTRAVENOUS at 14:50

## 2021-09-01 ASSESSMENT — PAIN SCALES - GENERAL
PAINLEVEL_OUTOF10: 5
PAINLEVEL_OUTOF10: 4
PAINLEVEL_OUTOF10: 5
PAINLEVEL_OUTOF10: 4
PAINLEVEL_OUTOF10: 5
PAINLEVEL_OUTOF10: 0
PAINLEVEL_OUTOF10: 5
PAINLEVEL_OUTOF10: 5
PAINLEVEL_OUTOF10: 6
PAINLEVEL_OUTOF10: 7

## 2021-09-01 ASSESSMENT — ENCOUNTER SYMPTOMS: EXERCISE TOLERANCE: GOOD (>4 METS)

## 2021-09-02 VITALS
HEART RATE: 77 BPM | RESPIRATION RATE: 18 BRPM | HEIGHT: 68 IN | WEIGHT: 185.19 LBS | BODY MASS INDEX: 28.07 KG/M2 | TEMPERATURE: 97.3 F | SYSTOLIC BLOOD PRESSURE: 104 MMHG | DIASTOLIC BLOOD PRESSURE: 65 MMHG | OXYGEN SATURATION: 99 %

## 2021-09-07 LAB
ASR DISCLAIMER: NORMAL
CASE RPRT: NORMAL
CLINICAL INFO: NORMAL
PATH REPORT.FINAL DX SPEC: NORMAL
PATH REPORT.GROSS SPEC: NORMAL

## 2021-09-08 ENCOUNTER — MED REC SCAN ONLY (OUTPATIENT)
Dept: FAMILY MEDICINE CLINIC | Facility: CLINIC | Age: 41
End: 2021-09-08

## 2021-09-30 ENCOUNTER — PATIENT MESSAGE (OUTPATIENT)
Dept: FAMILY MEDICINE CLINIC | Facility: CLINIC | Age: 41
End: 2021-09-30

## 2021-09-30 DIAGNOSIS — D64.9 LOW HEMOGLOBIN: Primary | ICD-10-CM

## 2021-09-30 NOTE — TELEPHONE ENCOUNTER
Hb slightly low. If any sx of HA, dizziness, weakness - schedule OV. Otherwise, lets repeat cbc. RN appt ok.  Order placed

## 2021-09-30 NOTE — TELEPHONE ENCOUNTER
Spoke with the pt and advised of the recommendations from Dr. Trujillo Code- she v/u  We scheduled her an appt  Future Appointments   Date Time Provider Bernard Rodgers   10/1/2021  3:00 PM EM[facility] NURSE EMGALMAZ Roth[place]

## 2021-09-30 NOTE — TELEPHONE ENCOUNTER
From: Marin West  To: Wallace Vargas MD  Sent: 9/30/2021 12:23 PM CDT  Subject: Question    I had a hysterectomy on 9/1 and I had a question about my blood counts.  The surgeon did not address any of it but I'm not sure if there is anything I should do

## 2021-10-01 ENCOUNTER — NURSE ONLY (OUTPATIENT)
Dept: FAMILY MEDICINE CLINIC | Facility: CLINIC | Age: 41
End: 2021-10-01
Payer: COMMERCIAL

## 2021-10-01 DIAGNOSIS — D64.9 LOW HEMOGLOBIN: ICD-10-CM

## 2021-10-01 PROCEDURE — 85025 COMPLETE CBC W/AUTO DIFF WBC: CPT | Performed by: FAMILY MEDICINE

## 2021-10-01 NOTE — PROGRESS NOTES
Patient here for a NV blood draw. Sherrie Black a lavender tube from patients left ac with a butterfly needle and no difficulty. Patient left in stable condition.

## 2021-10-06 NOTE — TELEPHONE ENCOUNTER
Hb is fine.  She can take OTC iron once daily x 2 weeks then continue a womans MVI daily  Will recheck at her px in 1/2022

## 2021-10-20 NOTE — TELEPHONE ENCOUNTER
Pt calling back. She states she is having a hard time focusing and mild dizziness. She does have some nausea but no vomiting. She had a hysterectomy on 9/1/21.   She has had vertigo in the past.  Symptoms started after her surgery and have not gotten any

## 2021-10-21 NOTE — TELEPHONE ENCOUNTER
Future Appointments   Date Time Provider Bernard Rodgers   10/25/2021 10:20 AM Jaylin Blanca MD [facility]

## 2021-10-25 ENCOUNTER — OFFICE VISIT (OUTPATIENT)
Dept: FAMILY MEDICINE CLINIC | Facility: CLINIC | Age: 41
End: 2021-10-25
Payer: COMMERCIAL

## 2021-10-25 VITALS
RESPIRATION RATE: 18 BRPM | HEIGHT: 67 IN | DIASTOLIC BLOOD PRESSURE: 80 MMHG | OXYGEN SATURATION: 98 % | BODY MASS INDEX: 29.82 KG/M2 | SYSTOLIC BLOOD PRESSURE: 110 MMHG | WEIGHT: 190 LBS | HEART RATE: 79 BPM

## 2021-10-25 DIAGNOSIS — R42 VERTIGO: Primary | ICD-10-CM

## 2021-10-25 PROCEDURE — 99213 OFFICE O/P EST LOW 20 MIN: CPT | Performed by: FAMILY MEDICINE

## 2021-10-25 PROCEDURE — 3008F BODY MASS INDEX DOCD: CPT | Performed by: FAMILY MEDICINE

## 2021-10-25 PROCEDURE — 3074F SYST BP LT 130 MM HG: CPT | Performed by: FAMILY MEDICINE

## 2021-10-25 PROCEDURE — 3079F DIAST BP 80-89 MM HG: CPT | Performed by: FAMILY MEDICINE

## 2021-10-25 RX ORDER — MECLIZINE HYDROCHLORIDE 25 MG/1
25 TABLET ORAL 3 TIMES DAILY PRN
Qty: 30 TABLET | Refills: 0 | Status: SHIPPED | OUTPATIENT
Start: 2021-10-25 | End: 2021-11-04

## 2021-10-25 NOTE — PATIENT INSTRUCTIONS
Dizziness (Uncertain Cause)  Dizziness is a common symptom. It may be described as lightheadedness, spinning, or feeling like you are going to faint. Dizziness can have many causes.    Tell the healthcare provider about:   · All medicines you take, includ (palpitations)  · Passing out or seizure  · Trouble walking or speaking  [place] last reviewed this educational content on 2/1/2020  © 3266-7743 [employer]. All rights reserved.  This information is not intended as a substitute for cristofer

## 2021-10-25 NOTE — PROGRESS NOTES
Patient presents with:  Dizziness: dizzy for 2 weeks. HPI:    Patient ID: Erinn Bhagat is a 36year old female.     HPI   Patient is here for dizziness going on since last few weeks patient has history of tumor September 1 and since then she is fe SURGICAL HISTORY N/A 3/4/2016    Procedure: ;  Surgeon: Sherman Pinto DO;  Location: [facility] L[facility] OR      Family History   Problem Relation Age of Onset   • Cancer Father         skin cancer   • Heart Disorder Father 58   • Hypertension Father    • vertigo in the past.  She reports it feels the same. Denies any warning symptoms like tingling numbness facial droop vision problems. Her physical examination is completely benign with vital stable.   Warning signs have been explained to the patient and d

## 2021-11-03 NOTE — TELEPHONE ENCOUNTER
Last refilled on 5/28/21 for # 15 with 0 refills  Last OV 10/25/21  No future appointments. Thank you.

## 2021-11-04 RX ORDER — ALPRAZOLAM 0.25 MG/1
0.25 TABLET ORAL DAILY PRN
Qty: 15 TABLET | Refills: 0 | Status: SHIPPED | OUTPATIENT
Start: 2021-11-04 | End: 2022-01-26

## 2021-12-06 ENCOUNTER — TELEPHONE (OUTPATIENT)
Dept: FAMILY MEDICINE CLINIC | Facility: CLINIC | Age: 41
End: 2021-12-06

## 2021-12-10 ENCOUNTER — OFFICE VISIT (OUTPATIENT)
Dept: FAMILY MEDICINE CLINIC | Facility: CLINIC | Age: 41
End: 2021-12-10
Payer: COMMERCIAL

## 2021-12-10 VITALS
SYSTOLIC BLOOD PRESSURE: 112 MMHG | RESPIRATION RATE: 18 BRPM | HEIGHT: 67 IN | WEIGHT: 188 LBS | HEART RATE: 79 BPM | BODY MASS INDEX: 29.51 KG/M2 | OXYGEN SATURATION: 98 % | TEMPERATURE: 98 F | DIASTOLIC BLOOD PRESSURE: 78 MMHG

## 2021-12-10 DIAGNOSIS — S39.012A STRAIN OF LUMBAR REGION, INITIAL ENCOUNTER: Primary | ICD-10-CM

## 2021-12-10 PROCEDURE — 99214 OFFICE O/P EST MOD 30 MIN: CPT | Performed by: FAMILY MEDICINE

## 2021-12-10 PROCEDURE — 3074F SYST BP LT 130 MM HG: CPT | Performed by: FAMILY MEDICINE

## 2021-12-10 PROCEDURE — 3008F BODY MASS INDEX DOCD: CPT | Performed by: FAMILY MEDICINE

## 2021-12-10 PROCEDURE — 3078F DIAST BP <80 MM HG: CPT | Performed by: FAMILY MEDICINE

## 2021-12-10 RX ORDER — CYCLOBENZAPRINE HCL 10 MG
10 TABLET ORAL NIGHTLY PRN
Qty: 10 TABLET | Refills: 0 | Status: SHIPPED | OUTPATIENT
Start: 2021-12-10 | End: 2021-12-20

## 2021-12-10 NOTE — PROGRESS NOTES
Patient presents with:  Low Back Pain: X1 week       HPI:    Patient ID: Sallie Savage is a 39year old female c/o  Low back pain x 1 week  Aggravated by: bending  No pain at rest. No radiation.   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       Meds This Visit:  Requested Prescriptions     Signed Prescriptions Disp Refills   • cyclobenzaprine 10 MG Oral Tab 10 tablet 0     Sig: Take 1 tablet (10 mg total) by mouth nightly a

## 2021-12-16 ENCOUNTER — HOSPITAL ENCOUNTER (OUTPATIENT)
Dept: GENERAL RADIOLOGY | Age: 41
Discharge: HOME | End: 2021-12-16
Attending: FAMILY MEDICINE
Payer: COMMERCIAL

## 2021-12-16 DIAGNOSIS — S39.012A STRAIN OF LUMBAR REGION, INITIAL ENCOUNTER: ICD-10-CM

## 2021-12-16 PROCEDURE — 72100 X-RAY EXAM L-S SPINE 2/3 VWS: CPT | Performed by: FAMILY MEDICINE

## 2021-12-18 ENCOUNTER — TELEPHONE (OUTPATIENT)
Dept: FAMILY MEDICINE CLINIC | Facility: CLINIC | Age: 41
End: 2021-12-18

## 2021-12-18 NOTE — TELEPHONE ENCOUNTER
----- Message from Erlinda Ortiz MD sent at 12/18/2021  5:40 AM CST -----  Some arthritis. No fracture. Pain likely muscular.  CPM

## 2021-12-20 RX ORDER — CYCLOBENZAPRINE HCL 10 MG
10 TABLET ORAL NIGHTLY PRN
Qty: 10 TABLET | Refills: 0 | Status: SHIPPED | OUTPATIENT
Start: 2021-12-20

## 2021-12-20 NOTE — TELEPHONE ENCOUNTER
Last refilled on 12/10/21 for # 10 with 0 refills  Last OV 12/10/21  Future Appointments   Date Time Provider Bernard Rodgers   12/27/2021  2:00 PM Karime Moreno MD [facility]        Thank you.

## 2021-12-27 ENCOUNTER — OFFICE VISIT (OUTPATIENT)
Dept: FAMILY MEDICINE CLINIC | Facility: CLINIC | Age: 41
End: 2021-12-27
Payer: COMMERCIAL

## 2021-12-27 VITALS
WEIGHT: 187 LBS | BODY MASS INDEX: 29.35 KG/M2 | DIASTOLIC BLOOD PRESSURE: 78 MMHG | TEMPERATURE: 97 F | RESPIRATION RATE: 18 BRPM | HEART RATE: 76 BPM | OXYGEN SATURATION: 99 % | HEIGHT: 67 IN | SYSTOLIC BLOOD PRESSURE: 116 MMHG

## 2021-12-27 DIAGNOSIS — M54.50 BILATERAL LOW BACK PAIN WITHOUT SCIATICA, UNSPECIFIED CHRONICITY: ICD-10-CM

## 2021-12-27 DIAGNOSIS — Z12.31 BREAST CANCER SCREENING BY MAMMOGRAM: Primary | ICD-10-CM

## 2021-12-27 DIAGNOSIS — Z23 NEED FOR VACCINATION: ICD-10-CM

## 2021-12-27 DIAGNOSIS — Z00.00 ANNUAL PHYSICAL EXAM: ICD-10-CM

## 2021-12-27 PROCEDURE — 90471 IMMUNIZATION ADMIN: CPT | Performed by: FAMILY MEDICINE

## 2021-12-27 PROCEDURE — 3008F BODY MASS INDEX DOCD: CPT | Performed by: FAMILY MEDICINE

## 2021-12-27 PROCEDURE — 80061 LIPID PANEL: CPT | Performed by: FAMILY MEDICINE

## 2021-12-27 PROCEDURE — 80050 GENERAL HEALTH PANEL: CPT | Performed by: FAMILY MEDICINE

## 2021-12-27 PROCEDURE — 83036 HEMOGLOBIN GLYCOSYLATED A1C: CPT | Performed by: FAMILY MEDICINE

## 2021-12-27 PROCEDURE — 90686 IIV4 VACC NO PRSV 0.5 ML IM: CPT | Performed by: FAMILY MEDICINE

## 2021-12-27 PROCEDURE — 3074F SYST BP LT 130 MM HG: CPT | Performed by: FAMILY MEDICINE

## 2021-12-27 PROCEDURE — 3078F DIAST BP <80 MM HG: CPT | Performed by: FAMILY MEDICINE

## 2021-12-27 PROCEDURE — 99396 PREV VISIT EST AGE 40-64: CPT | Performed by: FAMILY MEDICINE

## 2021-12-27 NOTE — PROGRESS NOTES
HPI:   Brandy Taylor is a 39year old female who presents for a complete physical exam.     Backpain unchanged  Lumbar xray done in 12/26/21 reviewed  Flexeril helps    Pap: s/p hysterectomy   Mammogram: due 1/2022  Gyne: Dr. Denilson Espinoza    F/U anxiety and d as needed for Anxiety.  15 tablet 0   • Sertraline HCl 50 MG Oral Tab TAKE 1 TABLET(50 MG) BY MOUTH DAILY 90 tablet 3      Past Medical History:   Diagnosis Date   • Abdominal hernia    • Abdominal pain    • Anemia    • Anxiety    • Blood in the stool    • any unusual skin lesions.  Sees Dr. Sonia Brar for annual mole  EYES:denies blurred vision or double vision  HEENT: denies nasal congestion, sinus pain or ST  LUNGS: denies shortness of breath or cough  CARDIOVASCULAR: denies chest pain or palpitations  GI: den

## 2021-12-30 ENCOUNTER — TELEPHONE (OUTPATIENT)
Dept: FAMILY MEDICINE CLINIC | Facility: CLINIC | Age: 41
End: 2021-12-30

## 2021-12-30 NOTE — TELEPHONE ENCOUNTER
----- Message from Jaspal Klein MD sent at 12/30/2021  3:36 PM CST -----  Cholesterol is high. This can be lowered by following a low-fat diet. Avoid fried foods, limit red meat to less than once a week, add flaxseed diet.   Rest of blood work is within

## 2022-01-01 DIAGNOSIS — F41.9 ANXIETY AND DEPRESSION: ICD-10-CM

## 2022-01-01 DIAGNOSIS — Z00.00 ANNUAL PHYSICAL EXAM: ICD-10-CM

## 2022-01-01 DIAGNOSIS — F32.A ANXIETY AND DEPRESSION: ICD-10-CM

## 2022-01-01 DIAGNOSIS — Z79.899 MEDICATION MANAGEMENT: ICD-10-CM

## 2022-01-27 RX ORDER — ALPRAZOLAM 0.25 MG/1
0.25 TABLET ORAL DAILY PRN
Qty: 15 TABLET | Refills: 0 | Status: SHIPPED | OUTPATIENT
Start: 2022-01-27

## 2022-01-27 NOTE — TELEPHONE ENCOUNTER
LOV: 12/27/21 for well adult    ALPRAZolam 0.25 MG Oral Tab 15 tablet 0 11/4/2021    Sig:   Take 1 tablet (0.25 mg total) by mouth daily as needed for Anxiety.        Future Appointments   Date Time Provider Bernard Rodgers   2/9/2022  3:20 PM [facility]

## 2022-02-25 ENCOUNTER — OFFICE VISIT (OUTPATIENT)
Dept: FAMILY MEDICINE CLINIC | Facility: CLINIC | Age: 42
End: 2022-02-25
Payer: COMMERCIAL

## 2022-02-25 VITALS
HEART RATE: 83 BPM | DIASTOLIC BLOOD PRESSURE: 74 MMHG | TEMPERATURE: 98 F | OXYGEN SATURATION: 99 % | BODY MASS INDEX: 29.82 KG/M2 | SYSTOLIC BLOOD PRESSURE: 118 MMHG | HEIGHT: 67 IN | RESPIRATION RATE: 18 BRPM | WEIGHT: 190 LBS

## 2022-02-25 DIAGNOSIS — N95.1 PERIMENOPAUSAL: ICD-10-CM

## 2022-02-25 DIAGNOSIS — N89.8 VAGINAL DRYNESS: Primary | ICD-10-CM

## 2022-02-25 DIAGNOSIS — F41.9 ANXIETY: ICD-10-CM

## 2022-02-25 PROCEDURE — 3074F SYST BP LT 130 MM HG: CPT | Performed by: FAMILY MEDICINE

## 2022-02-25 PROCEDURE — 99213 OFFICE O/P EST LOW 20 MIN: CPT | Performed by: FAMILY MEDICINE

## 2022-02-25 PROCEDURE — 3078F DIAST BP <80 MM HG: CPT | Performed by: FAMILY MEDICINE

## 2022-02-25 PROCEDURE — 3008F BODY MASS INDEX DOCD: CPT | Performed by: FAMILY MEDICINE

## 2022-03-07 ENCOUNTER — PATIENT MESSAGE (OUTPATIENT)
Dept: FAMILY MEDICINE CLINIC | Facility: CLINIC | Age: 42
End: 2022-03-07

## 2022-03-08 NOTE — TELEPHONE ENCOUNTER
From: Suzanne Rosas  To: Winifred Hess MD  Sent: 3/7/2022 8:17 PM CST  Subject: Follow up    About a week ago I saw doctor Orin and he dropped my zoloft from 50mg to 25mg to see. if it helped with my symptoms. She told me to follow up in 2 weeks. It's only been a week and I'm feeling the drop in my zoloft dose. (More irritable and anxiety) not sure it's a sign of the drop in dose or current life situations. Should I still only take 25mg for another week and see how things are? Or jump back to the 50mg? Is there anything else to try with my increase of lack of energy/libido?  I had bloodwork done in October after my hysterectomy and the surgeon said I was pre menaupasal

## 2022-03-10 NOTE — TELEPHONE ENCOUNTER
LMTCB    If patient calls back, cervical back to Zoloft 50 mg once daily and call back with update in 2 to 3 weeks

## 2022-03-31 NOTE — TELEPHONE ENCOUNTER
Last refilled on 1/27/22 for # 15 with 0 refills  Last OV 2/25/22  No future appointments. Thank you.

## 2022-04-01 RX ORDER — ALPRAZOLAM 0.25 MG/1
0.25 TABLET ORAL DAILY PRN
Qty: 15 TABLET | Refills: 0 | Status: SHIPPED | OUTPATIENT
Start: 2022-04-01

## 2022-07-05 NOTE — TELEPHONE ENCOUNTER
Last refilled on 4/1/22 for # 15 with 0 refills  Last OV 2/25/22  No future appointments. Thank you.

## 2022-07-07 RX ORDER — ALPRAZOLAM 0.25 MG/1
0.25 TABLET ORAL DAILY PRN
Qty: 15 TABLET | Refills: 0 | OUTPATIENT
Start: 2022-07-07

## 2022-07-07 RX ORDER — ALPRAZOLAM 0.25 MG/1
0.25 TABLET ORAL DAILY PRN
Qty: 15 TABLET | Refills: 0 | Status: SHIPPED | OUTPATIENT
Start: 2022-07-07

## 2022-07-19 ENCOUNTER — OFFICE VISIT (OUTPATIENT)
Dept: FAMILY MEDICINE CLINIC | Facility: CLINIC | Age: 42
End: 2022-07-19
Payer: COMMERCIAL

## 2022-07-19 VITALS
RESPIRATION RATE: 16 BRPM | WEIGHT: 185 LBS | BODY MASS INDEX: 28.04 KG/M2 | OXYGEN SATURATION: 98 % | TEMPERATURE: 98 F | HEART RATE: 102 BPM | HEIGHT: 68 IN | SYSTOLIC BLOOD PRESSURE: 121 MMHG | DIASTOLIC BLOOD PRESSURE: 82 MMHG

## 2022-07-19 DIAGNOSIS — B34.9 VIRAL ILLNESS: ICD-10-CM

## 2022-07-19 DIAGNOSIS — H66.002 NON-RECURRENT ACUTE SUPPURATIVE OTITIS MEDIA OF LEFT EAR WITHOUT SPONTANEOUS RUPTURE OF TYMPANIC MEMBRANE: Primary | ICD-10-CM

## 2022-07-19 LAB
OPERATOR ID: NORMAL
POCT LOT NUMBER: NORMAL
RAPID SARS-COV-2 BY PCR: NOT DETECTED

## 2022-07-19 PROCEDURE — 99213 OFFICE O/P EST LOW 20 MIN: CPT | Performed by: NURSE PRACTITIONER

## 2022-07-19 PROCEDURE — 3074F SYST BP LT 130 MM HG: CPT | Performed by: NURSE PRACTITIONER

## 2022-07-19 PROCEDURE — 3008F BODY MASS INDEX DOCD: CPT | Performed by: NURSE PRACTITIONER

## 2022-07-19 PROCEDURE — U0002 COVID-19 LAB TEST NON-CDC: HCPCS | Performed by: NURSE PRACTITIONER

## 2022-07-19 PROCEDURE — 3079F DIAST BP 80-89 MM HG: CPT | Performed by: NURSE PRACTITIONER

## 2022-07-19 RX ORDER — AMOXICILLIN 875 MG/1
875 TABLET, COATED ORAL 2 TIMES DAILY
Qty: 20 TABLET | Refills: 0 | Status: SHIPPED | OUTPATIENT
Start: 2022-07-19 | End: 2022-07-29

## 2022-09-01 ENCOUNTER — PATIENT MESSAGE (OUTPATIENT)
Dept: FAMILY MEDICINE CLINIC | Facility: CLINIC | Age: 42
End: 2022-09-01

## 2022-09-01 ENCOUNTER — TELEMEDICINE (OUTPATIENT)
Dept: FAMILY MEDICINE CLINIC | Facility: CLINIC | Age: 42
End: 2022-09-01

## 2022-09-01 DIAGNOSIS — U07.1 COVID: Primary | ICD-10-CM

## 2022-09-01 NOTE — TELEPHONE ENCOUNTER
Positive COVID home test  Symptoms started Tuesday  Congestion   Patient denies SOB/Difficulty breathing  Denies fever  Sore throat.   Future Appointments   Date Time Provider Bernard Rodgers   9/1/2022 11:30 AM [facility]

## 2022-09-01 NOTE — TELEPHONE ENCOUNTER
From: Kranthi Laith  To: Karina Connelly MD  Sent: 9/1/2022 10:48 AM CDT  Subject: Question    Hello,  On Tuesday I started to feel sick and took a At home covid test today. It came back positive. Besides isolation is there anything else I should do to help with symptoms? At this time i feel like I have a head cold.      Thanks,  [facility]

## 2022-09-26 RX ORDER — ALPRAZOLAM 0.25 MG/1
0.25 TABLET ORAL DAILY PRN
Qty: 15 TABLET | Refills: 0 | Status: CANCELLED | OUTPATIENT
Start: 2022-09-26

## 2022-10-21 ENCOUNTER — PATIENT MESSAGE (OUTPATIENT)
Dept: FAMILY MEDICINE CLINIC | Facility: CLINIC | Age: 42
End: 2022-10-21

## 2022-10-21 NOTE — TELEPHONE ENCOUNTER
From: Radha Santos  To: Grzegorz Sr MD  Sent: 10/21/2022 11:48 AM CDT  Subject: Question     Hello,  My gyno tested my testosterone and it was at 30. After being on testosterone cream for 6 weeks she re tested and my testosterone dropped to 28. She now wants me to try Premarian. I am unhappy with my current gyno ([facility] in [place]) and wondering if you recommend a doctor or if I should see you. My hormones have not been the same since hysterectomy last year and looking for looking for something to help.    Thanks

## 2022-11-23 ENCOUNTER — HOSPITAL ENCOUNTER (OUTPATIENT)
Dept: MAMMOGRAPHY | Age: 42
Discharge: HOME | End: 2022-11-23
Attending: FAMILY MEDICINE
Payer: COMMERCIAL

## 2022-11-23 DIAGNOSIS — Z12.31 BREAST CANCER SCREENING BY MAMMOGRAM: ICD-10-CM

## 2022-11-23 PROCEDURE — 77067 SCR MAMMO BI INCL CAD: CPT | Performed by: FAMILY MEDICINE

## 2022-11-23 PROCEDURE — 77063 BREAST TOMOSYNTHESIS BI: CPT | Performed by: FAMILY MEDICINE

## 2022-11-28 ENCOUNTER — TELEPHONE (OUTPATIENT)
Dept: FAMILY MEDICINE CLINIC | Facility: CLINIC | Age: 42
End: 2022-11-28

## 2022-11-28 NOTE — TELEPHONE ENCOUNTER
Pt scheduled a TB test for a new job,  need an order,   Also, pt has a px form that needs to be completed, last px 12/27/21.     Pt scheduled RN visit 12/1/22  Future Appointments   Date Time Provider Bernard Rodgers   12/1/2022  9:00 AM EM[facility] NURSE [facility]

## 2022-11-28 NOTE — TELEPHONE ENCOUNTER
Could you place order for TB test    Future Appointments   Date Time Provider Bernard Rodgers   12/1/2022  9:00 AM EM[facility] NURSE [facility] Mariella [place]

## 2022-12-01 ENCOUNTER — NURSE ONLY (OUTPATIENT)
Dept: FAMILY MEDICINE CLINIC | Facility: CLINIC | Age: 42
End: 2022-12-01
Payer: COMMERCIAL

## 2022-12-01 ENCOUNTER — TELEPHONE (OUTPATIENT)
Dept: FAMILY MEDICINE CLINIC | Facility: CLINIC | Age: 42
End: 2022-12-01

## 2022-12-01 PROCEDURE — 86580 TB INTRADERMAL TEST: CPT | Performed by: FAMILY MEDICINE

## 2022-12-01 NOTE — PROGRESS NOTES
Patient presented for Nurse visit for TB ready  Placed on right forearm  Patient tolerated well and left in stable condition  Form for TB test in DB folder for signing

## 2022-12-01 NOTE — TELEPHONE ENCOUNTER
Patient came for TB placement   Had last physical 12/27/21  Needs for signed for work  Form in your bin for review  Patient coming 12/3 for TB read

## 2022-12-02 NOTE — TELEPHONE ENCOUNTER
Form in nurse pending folder for apt tomorrow.   Future Appointments   Date Time Provider Bernard Rodgers   12/3/2022  9:00 AM EMG [facility] NURSE [facility] [place]

## 2022-12-03 ENCOUNTER — NURSE ONLY (OUTPATIENT)
Dept: FAMILY MEDICINE CLINIC | Facility: CLINIC | Age: 42
End: 2022-12-03
Payer: COMMERCIAL

## 2022-12-03 LAB — INDURATION (): 0 MM (ref 0–11)

## 2022-12-03 NOTE — PROGRESS NOTES
Patient presents for TB read of right forearm  Induration 0.0  [employer] form signed by Dr. Teresa Griffin and given to patient  Copy sent to scans

## 2022-12-19 ENCOUNTER — TELEPHONE (OUTPATIENT)
Dept: FAMILY MEDICINE CLINIC | Facility: CLINIC | Age: 42
End: 2022-12-19

## 2022-12-19 NOTE — TELEPHONE ENCOUNTER
Pt called said she has been having a sharp pain on lower back right side. But today its been all day. Per pt  She gets kidney stones and ovarian cyst. Pt would like to know if she should wait it out or be seen.

## 2023-02-10 NOTE — TELEPHONE ENCOUNTER
ALPRAZolam 0.25 MG Oral Tab 20 tablet 0 11/1/2022     Sig - Route: Take 1 tablet (0.25 mg total) by mouth daily as needed for Anxiety. - Oral      Last labs : 12/27/21. Last seen on 02/25/22 ( Menopause ) /74  Future Appointments   Date Time Provider Bernard Ana Maria   4/5/2023  4:30 PM Samina Lynn MD [facility]      Rx pended. Please advise. Thank you.

## 2023-02-11 RX ORDER — ALPRAZOLAM 0.25 MG/1
0.25 TABLET ORAL DAILY PRN
Qty: 20 TABLET | Refills: 0 | Status: SHIPPED | OUTPATIENT
Start: 2023-02-11

## 2023-02-27 ENCOUNTER — APPOINTMENT (OUTPATIENT)
Dept: CT IMAGING | Age: 43
End: 2023-02-27
Attending: PHYSICIAN ASSISTANT
Payer: COMMERCIAL

## 2023-02-27 ENCOUNTER — HOSPITAL ENCOUNTER (OUTPATIENT)
Age: 43
Discharge: HOME | End: 2023-02-27
Payer: COMMERCIAL

## 2023-02-27 ENCOUNTER — PATIENT MESSAGE (OUTPATIENT)
Dept: FAMILY MEDICINE CLINIC | Facility: CLINIC | Age: 43
End: 2023-02-27

## 2023-02-27 VITALS
RESPIRATION RATE: 18 BRPM | OXYGEN SATURATION: 98 % | TEMPERATURE: 98 F | HEART RATE: 76 BPM | SYSTOLIC BLOOD PRESSURE: 129 MMHG | DIASTOLIC BLOOD PRESSURE: 91 MMHG

## 2023-02-27 DIAGNOSIS — R10.9 RIGHT FLANK PAIN: Primary | ICD-10-CM

## 2023-02-27 DIAGNOSIS — N20.0 KIDNEY STONE: Primary | ICD-10-CM

## 2023-02-27 DIAGNOSIS — R35.0 URINARY FREQUENCY: ICD-10-CM

## 2023-02-27 LAB
#MXD IC: 0.4 X10ˆ3/UL (ref 0.1–1)
BUN BLD-MCNC: 14 MG/DL (ref 7–18)
CHLORIDE BLD-SCNC: 104 MMOL/L (ref 98–112)
CO2 BLD-SCNC: 26 MMOL/L (ref 21–32)
CREAT BLD-MCNC: 0.8 MG/DL
GFR SERPLBLD BASED ON 1.73 SQ M-ARVRAT: 94 ML/MIN/1.73M2 (ref 60–?)
GLUCOSE BLD-MCNC: 94 MG/DL (ref 70–99)
HCT VFR BLD AUTO: 39.3 %
HCT VFR BLD CALC: 40 %
HGB BLD-MCNC: 12.9 G/DL
ISTAT IONIZED CALCIUM FOR CHEM 8: 1.3 MMOL/L (ref 1.12–1.32)
LYMPHOCYTES # BLD AUTO: 2.9 X10ˆ3/UL (ref 1–4)
LYMPHOCYTES NFR BLD AUTO: 36 %
MCH RBC QN AUTO: 31.6 PG (ref 26–34)
MCHC RBC AUTO-ENTMCNC: 32.8 G/DL (ref 31–37)
MCV RBC AUTO: 96.3 FL (ref 80–100)
MIXED CELL %: 5.4 %
NEUTROPHILS # BLD AUTO: 4.7 X10ˆ3/UL (ref 1.5–7.7)
NEUTROPHILS NFR BLD AUTO: 58.6 %
PLATELET # BLD AUTO: 330 X10ˆ3/UL (ref 150–450)
POCT BILIRUBIN URINE: NEGATIVE
POCT GLUCOSE URINE: NEGATIVE MG/DL
POCT KETONE URINE: NEGATIVE MG/DL
POCT NITRITE URINE: NEGATIVE
POCT PH URINE: 7 (ref 5–8)
POCT PROTEIN URINE: NEGATIVE MG/DL
POCT SPECIFIC GRAVITY URINE: 1.02
POCT URINE CLARITY: CLEAR
POCT URINE COLOR: YELLOW
POCT UROBILINOGEN URINE: 0.2 MG/DL
POTASSIUM BLD-SCNC: 3.8 MMOL/L (ref 3.6–5.1)
RBC # BLD AUTO: 4.08 X10ˆ6/UL
SODIUM BLD-SCNC: 140 MMOL/L (ref 136–145)
WBC # BLD AUTO: 8 X10ˆ3/UL (ref 4–11)

## 2023-02-27 PROCEDURE — 81002 URINALYSIS NONAUTO W/O SCOPE: CPT | Performed by: PHYSICIAN ASSISTANT

## 2023-02-27 PROCEDURE — 74176 CT ABD & PELVIS W/O CONTRAST: CPT | Performed by: PHYSICIAN ASSISTANT

## 2023-02-27 PROCEDURE — 85025 COMPLETE CBC W/AUTO DIFF WBC: CPT | Performed by: PHYSICIAN ASSISTANT

## 2023-02-27 PROCEDURE — 80047 BASIC METABLC PNL IONIZED CA: CPT | Performed by: PHYSICIAN ASSISTANT

## 2023-02-27 PROCEDURE — 99213 OFFICE O/P EST LOW 20 MIN: CPT | Performed by: PHYSICIAN ASSISTANT

## 2023-02-27 RX ORDER — SULFAMETHOXAZOLE AND TRIMETHOPRIM 800; 160 MG/1; MG/1
1 TABLET ORAL 2 TIMES DAILY
Qty: 20 TABLET | Refills: 0 | Status: SHIPPED | OUTPATIENT
Start: 2023-02-27 | End: 2023-03-09

## 2023-02-27 RX ORDER — SODIUM CHLORIDE 9 MG/ML
INJECTION, SOLUTION INTRAVENOUS ONCE
Status: COMPLETED | OUTPATIENT
Start: 2023-02-27 | End: 2023-02-27

## 2023-02-27 NOTE — ED INITIAL ASSESSMENT (HPI)
x1 month Pt c/o intermittent right lower back pain without radiation, nausea    Denies: fevers, vomiting/diarrhea

## 2023-02-27 NOTE — DISCHARGE INSTRUCTIONS
Push clear fluids. Monitor MyChart for urine culture. If this demonstrates no growth I recommend stopping antibiotic.   Follow-up with both previous urologist and GI specialist.

## 2023-02-28 RX ORDER — NITROFURANTOIN 25; 75 MG/1; MG/1
100 CAPSULE ORAL 2 TIMES DAILY
Qty: 10 CAPSULE | Refills: 0 | Status: SHIPPED | OUTPATIENT
Start: 2023-02-28 | End: 2023-03-05

## 2023-02-28 NOTE — TELEPHONE ENCOUNTER
Patient advised. Verbalized understanding.    Future Appointments   Date Time Provider Bernard Rodgers   3/1/2023  2:15 PM Becky Garcia MD [facility] [place]   3/10/2023  2:00 PM Donovan Conklin MD [facility]   4/5/2023  4:30 PM Becky Garcia MD [facility] Marc [place]

## 2023-02-28 NOTE — TELEPHONE ENCOUNTER
Pt called said she was seen yesterday in the IC and was given sulfamethoxazole-trimethoprim -160 MG Oral Tab per tablet   And since she started taking Rx shes been having rapid heart rate and frequent urination . Would like to know if it could be because of the medication if so she would like to know if she can take something else.  [facility]

## 2023-02-28 NOTE — TELEPHONE ENCOUNTER
From: Kelsie Louis  To: Rakesh Dodson MD  Sent: 2/27/2023 5:35 PM CST  Subject: CT and urine follow up    Hi,  I went to urgent care today due to lower right back pain. Can you look over the findings. The ER doctor seemed confused as I also have blood in my urine and I didn't understand all he was saying. He did recommend seeing my urologist and possibly the GI doctor. I also saw in the CT findings that there is something listed under my liver. Thank you.

## 2023-02-28 NOTE — TELEPHONE ENCOUNTER
Call from patient  States in [address] [place] [address] 2/27/23 for flank pain and hematuria  States she wasn't given an explanation for labs or why they are putting her on bactrim  Does have back/flank pain and has hx of kidney stones  States CT done yesterday showed some nonobstructing stones  States since she started bactrim yesterday felt rapid HR, nausea  Denies chest pain/SOB  Routing to covering provider

## 2023-02-28 NOTE — TELEPHONE ENCOUNTER
If bactrim is causing se then stop it and I sent macrobid. She did have LE and blood in urine to may be treat her for uti. Regarding ct scan schedule ov or vv with Dr. Ethan Moore to discuss.

## 2023-04-05 ENCOUNTER — OFFICE VISIT (OUTPATIENT)
Dept: FAMILY MEDICINE CLINIC | Facility: CLINIC | Age: 43
End: 2023-04-05
Payer: COMMERCIAL

## 2023-04-05 VITALS
RESPIRATION RATE: 16 BRPM | DIASTOLIC BLOOD PRESSURE: 70 MMHG | HEIGHT: 67.5 IN | BODY MASS INDEX: 28.54 KG/M2 | HEART RATE: 80 BPM | TEMPERATURE: 98 F | SYSTOLIC BLOOD PRESSURE: 118 MMHG | WEIGHT: 184 LBS | OXYGEN SATURATION: 99 %

## 2023-04-05 DIAGNOSIS — Z00.00 ANNUAL PHYSICAL EXAM: Primary | ICD-10-CM

## 2023-04-05 DIAGNOSIS — F41.9 ANXIETY: ICD-10-CM

## 2023-04-05 DIAGNOSIS — Z51.81 THERAPEUTIC DRUG MONITORING: ICD-10-CM

## 2023-04-05 PROCEDURE — 3008F BODY MASS INDEX DOCD: CPT | Performed by: FAMILY MEDICINE

## 2023-04-05 PROCEDURE — 99396 PREV VISIT EST AGE 40-64: CPT | Performed by: FAMILY MEDICINE

## 2023-04-05 PROCEDURE — 3074F SYST BP LT 130 MM HG: CPT | Performed by: FAMILY MEDICINE

## 2023-04-05 PROCEDURE — 3078F DIAST BP <80 MM HG: CPT | Performed by: FAMILY MEDICINE

## 2023-05-08 DIAGNOSIS — Z79.899 MEDICATION MANAGEMENT: ICD-10-CM

## 2023-05-08 DIAGNOSIS — Z00.00 ANNUAL PHYSICAL EXAM: ICD-10-CM

## 2023-05-08 DIAGNOSIS — F32.A ANXIETY AND DEPRESSION: ICD-10-CM

## 2023-05-08 DIAGNOSIS — F41.9 ANXIETY AND DEPRESSION: ICD-10-CM

## 2023-05-08 RX ORDER — ALPRAZOLAM 0.25 MG/1
0.25 TABLET ORAL DAILY PRN
Qty: 20 TABLET | Refills: 0 | Status: SHIPPED | OUTPATIENT
Start: 2023-05-08

## 2023-05-15 ENCOUNTER — PATIENT MESSAGE (OUTPATIENT)
Dept: FAMILY MEDICINE CLINIC | Facility: CLINIC | Age: 43
End: 2023-05-15

## 2023-05-15 DIAGNOSIS — N20.0 KIDNEY STONE: Primary | ICD-10-CM

## 2023-05-15 NOTE — TELEPHONE ENCOUNTER
Routing to [place]. This pt had an approved referral in March but got charged for her OV with Dr. Yoel Cleaning.

## 2023-05-15 NOTE — TELEPHONE ENCOUNTER
Message sent to patient confirming the amount of visits she had with Dr Dawn Hammond, as there was a referral authorized on 3/3/23 for one visit

## 2023-05-15 NOTE — TELEPHONE ENCOUNTER
From: Lori Wilder  To: Wilder Fournier MD  Sent: 5/15/2023 10:14 AM CDT  Subject: Referral     Hello. Back in March I reached out for a referral for Dr. Norma Chua with [facility] in [place]. I received a bill from them and they stated that they did not receive a referral for my March visit. Can another one be sent to the office?   Thanks,  Jeff Felix

## 2023-07-15 NOTE — TELEPHONE ENCOUNTER
Last OV 4/5/23  Last refilled on 5/8/23 for # 20 with 0 refills  No future appointments. Thank you. Patient needing a 1 week hospital follow up .

## 2023-07-18 RX ORDER — ALPRAZOLAM 0.25 MG/1
0.25 TABLET ORAL DAILY PRN
Qty: 20 TABLET | Refills: 0 | Status: SHIPPED | OUTPATIENT
Start: 2023-07-18

## 2023-08-07 ENCOUNTER — TELEPHONE (OUTPATIENT)
Dept: FAMILY MEDICINE CLINIC | Facility: CLINIC | Age: 43
End: 2023-08-07

## 2023-09-09 ENCOUNTER — PATIENT MESSAGE (OUTPATIENT)
Dept: FAMILY MEDICINE CLINIC | Facility: CLINIC | Age: 43
End: 2023-09-09

## 2023-09-09 NOTE — TELEPHONE ENCOUNTER
From: Gibran Chery  To: Beverly Solis MD  Sent: 9/9/2023 8:26 AM CDT  Subject: Question     Hello,  I have noticed that my left breast has been very sore and tender on the left side. I am up to date on my mammogram but wasn't sure if I need another imaging.

## 2023-09-15 ENCOUNTER — OFFICE VISIT (OUTPATIENT)
Dept: FAMILY MEDICINE CLINIC | Facility: CLINIC | Age: 43
End: 2023-09-15
Payer: COMMERCIAL

## 2023-09-15 VITALS
HEART RATE: 62 BPM | BODY MASS INDEX: 29.16 KG/M2 | RESPIRATION RATE: 18 BRPM | DIASTOLIC BLOOD PRESSURE: 60 MMHG | HEIGHT: 67.5 IN | SYSTOLIC BLOOD PRESSURE: 102 MMHG | OXYGEN SATURATION: 98 % | WEIGHT: 188 LBS | TEMPERATURE: 97 F

## 2023-09-15 DIAGNOSIS — N64.4 BREAST PAIN, LEFT: Primary | ICD-10-CM

## 2023-09-15 DIAGNOSIS — R07.89 CHEST WALL PAIN: ICD-10-CM

## 2023-09-15 PROCEDURE — 3074F SYST BP LT 130 MM HG: CPT | Performed by: FAMILY MEDICINE

## 2023-09-15 PROCEDURE — 90471 IMMUNIZATION ADMIN: CPT | Performed by: FAMILY MEDICINE

## 2023-09-15 PROCEDURE — 3078F DIAST BP <80 MM HG: CPT | Performed by: FAMILY MEDICINE

## 2023-09-15 PROCEDURE — 90686 IIV4 VACC NO PRSV 0.5 ML IM: CPT | Performed by: FAMILY MEDICINE

## 2023-09-15 PROCEDURE — 3008F BODY MASS INDEX DOCD: CPT | Performed by: FAMILY MEDICINE

## 2023-09-15 PROCEDURE — 99214 OFFICE O/P EST MOD 30 MIN: CPT | Performed by: FAMILY MEDICINE

## 2023-09-26 NOTE — TELEPHONE ENCOUNTER
Last OV 9/15/23  Last refilled on 7/18/23 for # 20 with 0 refills  No future appointments. Thank you.

## 2023-09-28 RX ORDER — ALPRAZOLAM 0.25 MG/1
0.25 TABLET ORAL DAILY PRN
Qty: 20 TABLET | Refills: 0 | Status: SHIPPED | OUTPATIENT
Start: 2023-09-28

## 2023-10-16 ENCOUNTER — TELEPHONE (OUTPATIENT)
Dept: FAMILY MEDICINE CLINIC | Facility: CLINIC | Age: 43
End: 2023-10-16

## 2023-10-23 ENCOUNTER — PATIENT MESSAGE (OUTPATIENT)
Dept: FAMILY MEDICINE CLINIC | Facility: CLINIC | Age: 43
End: 2023-10-23

## 2023-10-23 NOTE — TELEPHONE ENCOUNTER
From: Sue Flight  To: Sandra Camila  Sent: 10/23/2023 12:24 PM CDT  Subject: Question    Hi,  I have noticed an uptick in my anxiety and wasn't sure what to do. I have my xanex but do not want to rely on taking it all the time. The anxiety is triggered while at work.     Thanks,  [facility]

## 2023-10-24 NOTE — TELEPHONE ENCOUNTER
Future Appointments   Date Time Provider Bernard Rodgers   10/26/2023 10:20 AM Preethi Guajardo MD [facility]

## 2023-10-26 RX ORDER — SERTRALINE HYDROCHLORIDE 100 MG/1
100 TABLET, FILM COATED ORAL DAILY
Qty: 90 TABLET | Refills: 0 | OUTPATIENT
Start: 2023-10-26

## 2023-12-04 ENCOUNTER — HOSPITAL ENCOUNTER (OUTPATIENT)
Dept: MAMMOGRAPHY | Age: 43
Discharge: HOME | End: 2023-12-04
Attending: FAMILY MEDICINE
Payer: COMMERCIAL

## 2023-12-04 DIAGNOSIS — Z12.31 SCREENING MAMMOGRAM, ENCOUNTER FOR: ICD-10-CM

## 2023-12-04 PROCEDURE — 77063 BREAST TOMOSYNTHESIS BI: CPT | Performed by: FAMILY MEDICINE

## 2023-12-04 PROCEDURE — 77067 SCR MAMMO BI INCL CAD: CPT | Performed by: FAMILY MEDICINE

## 2023-12-15 ENCOUNTER — OFFICE VISIT (OUTPATIENT)
Dept: FAMILY MEDICINE CLINIC | Facility: CLINIC | Age: 43
End: 2023-12-15
Payer: COMMERCIAL

## 2023-12-15 VITALS
RESPIRATION RATE: 18 BRPM | BODY MASS INDEX: 28.34 KG/M2 | HEART RATE: 70 BPM | SYSTOLIC BLOOD PRESSURE: 116 MMHG | DIASTOLIC BLOOD PRESSURE: 79 MMHG | HEIGHT: 68 IN | WEIGHT: 187 LBS | TEMPERATURE: 98 F

## 2023-12-15 DIAGNOSIS — Z20.822 EXPOSURE TO COVID-19 VIRUS: ICD-10-CM

## 2023-12-15 DIAGNOSIS — J02.9 SORE THROAT: Primary | ICD-10-CM

## 2023-12-15 LAB
CONTROL LINE PRESENT WITH A CLEAR BACKGROUND (YES/NO): YES YES/NO
KIT LOT #: NORMAL NUMERIC
OPERATOR ID: NORMAL
RAPID SARS-COV-2 BY PCR: NOT DETECTED
STREP GRP A CUL-SCR: NEGATIVE

## 2023-12-15 PROCEDURE — 3008F BODY MASS INDEX DOCD: CPT | Performed by: NURSE PRACTITIONER

## 2023-12-15 PROCEDURE — 87880 STREP A ASSAY W/OPTIC: CPT | Performed by: NURSE PRACTITIONER

## 2023-12-15 PROCEDURE — 99213 OFFICE O/P EST LOW 20 MIN: CPT | Performed by: NURSE PRACTITIONER

## 2023-12-15 PROCEDURE — 3074F SYST BP LT 130 MM HG: CPT | Performed by: NURSE PRACTITIONER

## 2023-12-15 PROCEDURE — U0002 COVID-19 LAB TEST NON-CDC: HCPCS | Performed by: NURSE PRACTITIONER

## 2023-12-15 PROCEDURE — 3078F DIAST BP <80 MM HG: CPT | Performed by: NURSE PRACTITIONER

## 2023-12-15 NOTE — PATIENT INSTRUCTIONS
Push fluids  Salt water gargles  Tylenol/ ibuprofen for pain  Follow up for any new or worsening symptoms  Follow up with your primary care doctor if not improving over the next few days.

## 2024-01-03 ENCOUNTER — TELEPHONE (OUTPATIENT)
Dept: FAMILY MEDICINE CLINIC | Facility: CLINIC | Age: 44
End: 2024-01-03

## 2024-01-03 ENCOUNTER — LAB ENCOUNTER (OUTPATIENT)
Dept: LAB | Age: 44
End: 2024-01-03
Attending: FAMILY MEDICINE
Payer: COMMERCIAL

## 2024-01-03 DIAGNOSIS — Z00.00 ANNUAL PHYSICAL EXAM: ICD-10-CM

## 2024-01-03 LAB
CHOLEST SERPL-MCNC: 209 MG/DL (ref ?–200)
EST. AVERAGE GLUCOSE BLD GHB EST-MCNC: 111 MG/DL (ref 68–126)
FASTING PATIENT LIPID ANSWER: YES
HBA1C MFR BLD: 5.5 % (ref ?–5.7)
HDLC SERPL-MCNC: 65 MG/DL (ref 40–59)
LDLC SERPL CALC-MCNC: 129 MG/DL (ref ?–100)
NONHDLC SERPL-MCNC: 144 MG/DL (ref ?–130)
TRIGL SERPL-MCNC: 83 MG/DL (ref 30–149)
TSI SER-ACNC: 1.17 MIU/ML (ref 0.36–3.74)
VLDLC SERPL CALC-MCNC: 15 MG/DL (ref 0–30)

## 2024-01-03 PROCEDURE — 84443 ASSAY THYROID STIM HORMONE: CPT

## 2024-01-03 PROCEDURE — 83036 HEMOGLOBIN GLYCOSYLATED A1C: CPT

## 2024-01-03 PROCEDURE — 36415 COLL VENOUS BLD VENIPUNCTURE: CPT

## 2024-01-03 PROCEDURE — 80061 LIPID PANEL: CPT

## 2024-01-04 ENCOUNTER — TELEPHONE (OUTPATIENT)
Dept: FAMILY MEDICINE CLINIC | Facility: CLINIC | Age: 44
End: 2024-01-04

## 2024-01-04 NOTE — TELEPHONE ENCOUNTER
----- Message from YASH Rasheed sent at 1/4/2024  7:39 AM CST -----  Cholesterol improved from previous. Continue working on heart healthy diet  Thyroid function normal

## 2024-01-05 NOTE — TELEPHONE ENCOUNTER
Delivery Read From Message Type Attachments Subject   1/4/2024  2:13 PM Y Renata Jara RN Patient Medical Advice Request  test results

## 2024-01-08 ENCOUNTER — PATIENT MESSAGE (OUTPATIENT)
Dept: FAMILY MEDICINE CLINIC | Facility: CLINIC | Age: 44
End: 2024-01-08

## 2024-01-09 NOTE — TELEPHONE ENCOUNTER
Can you schedule her with Jackie if any opening this wk.  If sym get worse or new symptoms go to ic.

## 2024-01-09 NOTE — TELEPHONE ENCOUNTER
Any injury? No   Any difficulty moving your neck? No  Any swelling or redness? I dont know if its swelling or just tender to touch.  Most of the pain is in the lower left almost behind the ear by my head   Does the pain radiate anywhere else? No  Any vision changes? No  Any chest pain, numbness or tingling, or shortness of breath? No       You  Radha Santos2 hours ago (8:15 AM)     REINA Garcia,  Any injury? Any difficulty moving your neck? Any swelling or redness? Does the pain radiate anywhere else? Any vision changes? Any chest pain, numbness or tingling, or shortness of breath?  -Renata WHARTON, RN  [facility] Office Phone: [phone]       Radha JOAQUIN [facility] Clinical Staff (supporting Edel Ramos MD)15 hours ago (6:35 PM)       Hello,  For the past 3 days I have had pain on the left side at the base of my skull.  I have tried advil and it hasn't helped.  I am not sure if I should wait it out or come get it looked at.  It is very sensitive to touch.

## 2024-01-09 NOTE — TELEPHONE ENCOUNTER
From: Radha Santos  To: Edel Ramos  Sent: 1/8/2024 6:35 PM CST  Subject: Question    Hello,  For the past 3 days I have had pain on the left side at the base of my skull. I have tried advil and it hasn't helped. I am not sure if I should wait it out or come get it looked at. It is very sensitive to touch.     Thanks

## 2024-01-11 NOTE — TELEPHONE ENCOUNTER
Future Appointments   Date Time Provider Department Center   1/12/2024  9:00 AM Nelia Subramanian APRN [facility] [place]

## 2024-02-16 NOTE — PROGRESS NOTES
Writer received a referral from YASH Rasheed, for this patient to receive resources for their behavioral health needs.    Writer left message on patient's VM with call back information, and explained what they could expect during the initial session, as well as this writers affiliation with [facility]      Care including behavioral health information will be communicated between this writer and staff at [facility] as this writer is part of the interdisciplinary team at this primary care physician office.

## 2024-04-21 DIAGNOSIS — F41.0 PANIC ATTACK: ICD-10-CM

## 2024-04-21 RX ORDER — ALPRAZOLAM 0.25 MG/1
0.25 TABLET ORAL DAILY PRN
Qty: 20 TABLET | Refills: 0 | Status: SHIPPED | OUTPATIENT
Start: 2024-04-21

## 2024-06-10 DIAGNOSIS — F41.0 PANIC ATTACK: ICD-10-CM

## 2024-06-12 RX ORDER — ALPRAZOLAM 0.25 MG/1
0.25 TABLET ORAL DAILY PRN
Qty: 20 TABLET | Refills: 0 | Status: SHIPPED | OUTPATIENT
Start: 2024-06-12

## 2024-08-03 DIAGNOSIS — F41.0 PANIC ATTACK: ICD-10-CM

## 2024-08-03 RX ORDER — ALPRAZOLAM 0.25 MG/1
0.25 TABLET ORAL DAILY PRN
Qty: 20 TABLET | Refills: 0 | Status: SHIPPED | OUTPATIENT
Start: 2024-08-03

## 2024-08-03 NOTE — TELEPHONE ENCOUNTER
Last office visit 2/15/24  Last refilled on 6/12/24 for # 20 with 0 refills  No future appointments.     Thank you.

## 2024-08-18 ENCOUNTER — PATIENT MESSAGE (OUTPATIENT)
Dept: FAMILY MEDICINE CLINIC | Facility: CLINIC | Age: 44
End: 2024-08-18

## 2024-08-18 DIAGNOSIS — Z79.899 MEDICATION MANAGEMENT: ICD-10-CM

## 2024-08-18 DIAGNOSIS — F32.A ANXIETY AND DEPRESSION: ICD-10-CM

## 2024-08-18 DIAGNOSIS — F41.9 ANXIETY AND DEPRESSION: ICD-10-CM

## 2024-08-18 DIAGNOSIS — F41.9 ANXIETY: ICD-10-CM

## 2024-08-18 DIAGNOSIS — Z00.00 ANNUAL PHYSICAL EXAM: ICD-10-CM

## 2024-08-19 NOTE — TELEPHONE ENCOUNTER
From: Radha Santos  Sent: 8/18/2024 9:13 PM CDT  To: Eduardo [facility] Clinical Staff  Subject: Refill    I had picked up a prescription in June I believe. I'm either out or can't find it! Can it be submitted to my pharmacy. I'm completely out!

## 2024-08-19 NOTE — TELEPHONE ENCOUNTER
Is she taking 50 mg or 75 mg?       From last appointment:  Diagnoses and all orders for this visit:     Anxiety  -     [facility] Referral - In Network  -     sertraline 50 MG Oral Tab; Take 1.5 tablets (75 mg total) by mouth daily.     Panic attack  -     [facility] Referral - In Network  -     ALPRAZolam 0.25 MG Oral Tab; Take 1 tablet (0.25 mg total) by mouth daily as needed for Anxiety.     Perimenopausal  -     [facility] Referral - In Network     Increase Sertraline to 75 mg daily  PRN Xanax  Establish with therapist to work on developing coping mechanisms  Call with update in 2-3 weeks  Establish with gyne

## 2024-08-19 NOTE — TELEPHONE ENCOUNTER
Last office visit 2/15/24  Last refilled on 2/15/24 for # 135 with 0 refills  No future appointments.     Thank you.

## 2024-09-09 ENCOUNTER — PATIENT MESSAGE (OUTPATIENT)
Dept: FAMILY MEDICINE CLINIC | Facility: CLINIC | Age: 44
End: 2024-09-09

## 2024-09-09 NOTE — TELEPHONE ENCOUNTER
From: Radha Santos  To: Edel Ramos  Sent: 9/9/2024 8:31 AM CDT  Subject: Covid    Hello.  I tested positive for covid on Saturday. My symptoms started Wednesday night. My symptoms continue to not get better. What is the current protocol for covid guidelines?

## 2024-10-07 DIAGNOSIS — F41.0 PANIC ATTACK: ICD-10-CM

## 2024-10-07 RX ORDER — ALPRAZOLAM 0.25 MG
0.25 TABLET ORAL DAILY PRN
Qty: 20 TABLET | Refills: 0 | OUTPATIENT
Start: 2024-10-07

## 2024-10-07 NOTE — TELEPHONE ENCOUNTER
Controlled Substance Medication [id number]/07/2024 10:16 AM    This medication is a controlled substance - forward to provider to refill     Request for  ALPRAZolam 0.25 MG Oral Tab     LOV 2/15/24 with   Nelia Subramanian APRN      Last refill 8/3/24 - 20 tablets 0 refill      No future appointments.    Labs 1/3/24

## 2024-11-14 NOTE — TELEPHONE ENCOUNTER
LOV: 2/15/24 for anxiety           sertraline 50 MG Oral Tab 135 tablet 0 8/19/2024 11/17/2024   Sig:   Take 1.5 tablets (75 mg total) by mouth daily.         No future appointments.  Please advise

## 2024-11-24 NOTE — PROGRESS NOTES
Chief Complaint   Patient presents with    Physical     No concerns     Care Gap Management     Mammogram   Vaccines   Tobacco cessation counseling          HPI  Radha Santos is a 44 year old female here for physical.    Last colon cancer screen:  2020 to be repeated in 10 years    Last mammo: 2023    Last Pap: -s/p partial hysterectomy, due for pap    Acute concerns none    F/u anxiety: well controlled with sertraline. Takes xanax 2x/month    Discussed:  - diet: regular diet  - exercise:  walking   - sleep: adequate  - stress: well controlled with sertraline and xanax  - gyne: LMP: no longer having periods  - vision: UTD  - dentist visit: UTD    ROS  As per HPI      Depression Screening (PHQ-2/PHQ-9): Over the LAST 2 WEEKS   Little interest or pleasure in doing things: Not at all    Feeling down, depressed, or hopeless: Not at all    PHQ-2 SCORE: 0          Past Medical History:    Abdominal hernia    Abdominal pain    Abnormal CT scan, gastrointestinal tract    Anemia    Anxiety    Blood in stool    Blood in the stool    Body piercing    Calculus of kidney    Colitis    Constipation    Decorative tattoo    Diarrhea, unspecified    Dysmenorrhea    Endometriosis    Endometriosis    Functional diarrhea    Heartburn    Heavy menses    History of blood transfusion     After emergency surgery.    Hypothyroid    Indigestion    Infertility, female    ivf    Insomnia    Irregular bowel habits    Menses painful    Mild dysplasia of cervix    Pain with bowel movements    Polycystic ovarian disease    Polycystic ovarian disease    Skin cancer, basal cell    Skin cancer, basal cell       Past Surgical History:   Procedure Laterality Date    Appendectomy            Colonoscopy  ^    Hernia surgery      Hysterectomy          Oophorectomy Left     Other surgical history      left ovary removed for dermoid cyst    Other surgical history      b/l inguinal hernia    Other surgical history N/A 2016     Procedure: ;  Surgeon: Jesse Monteiro DO;  Location:  [facility]       Social History     Socioeconomic History    Marital status:    Tobacco Use    Smoking status: Some Days     Current packs/day: 0.00     Types: Cigarettes     Passive exposure: Never    Smokeless tobacco: Never    Tobacco comments:     OCC   Vaping Use    Vaping status: Never Used   Substance and Sexual Activity    Alcohol use: Yes     Alcohol/week: 0.0 standard drinks of alcohol     Comment: OCC    Drug use: No    Sexual activity: Yes     Partners: Male   Other Topics Concern    Caffeine Concern Yes     Comment: 1 cup daily     Exercise No    Seat Belt Yes       Family History   Problem Relation Age of Onset    Cancer Father         skin cancer    Heart Disorder Father 62    Hypertension Father     Colon Polyps Father     Heart Attack Father     Psychiatric Sister     Psychiatric Brother     Breast Cancer Maternal Grandmother 80    Cancer Paternal Grandmother     Breast Cancer Paternal Grandmother 80    Diabetes Neg         Medications Ordered Prior to Encounter[1]    Immunization History   Administered Date(s) Administered    Covid-19 Vaccine [employer] ([employer]) 0.5ml 2021    Covid-19 Vaccine Moderna 50 Mcg/0.25 Ml 2022    FLULAVAL 6 months & older 0.5 ml Prefilled syringe (92031) 2020, 2021    FLUZONE 6 months and older PFS 0.5 ml (72357) 09/15/2023    Influenza 10/30/2015, 2022    TDAP 2016    Tb Intradermal Test 10/20/2012, 2022            Objective  Vitals:    24 0843   BP: 116/78   Pulse: 98   Resp: 16   Temp: 97 °F (36.1 °C)   SpO2: 99%   Weight: 186 lb 6.4 oz (84.6 kg)   Height: 5' 8\" (1.727 m)   Body mass index is 28.34 kg/m².    Physical Exam  Constitutional:       Appearance: Normal appearance.   HEENT:      Head: Normocephalic and atraumatic.      Eyes: PERRLA no notable nystagmus     Ears: normal on observation     Nose: Nose normal.      Mouth: Mucous membranes are moist.       Neck: no lymphadenopathy  Cardiovascular:      Rate and Rhythm: Normal rate and regular rhythm.   Pulmonary:      Effort: Pulmonary effort is normal.      Breath sounds: Normal breath sounds.   Abdominal:      General: Bowel sounds are normal.      Palpations: Abdomen is soft. There is no mass.   Musculoskeletal:         General: Normal range of motion.   Skin:     General: Skin is warm and dry.   Neurological:      General: No focal deficit present.      Mental Status: Alert and oriented to person, place, and time.   Psychiatric:         Mood and Affect: Mood normal.         Thought Content: Thought content normal.       Assessment and Plan  Radha was seen today for physical and care gap management.    Diagnoses and all orders for this visit:    Annual physical exam  -     TSH W Reflex To Free T4; Future  -     Lipid Panel; Future  -     Comp Metabolic Panel (14); Future  -     CBC With Differential With Platelet; Future  -     Hemoglobin A1C [E]; Future    Anxiety  Comments:  Stable, takes sertraline daily and xanax as needed, continue    Breast cancer screening by mammogram  -     [facility] 2D+3D SCREENING [facility](04212/20695); Future    Cervical cancer screening  -     [facility] Referral - In Network    Tobacco abuse counseling     I have encouraged the patient to stop tobacco products.  I counseled on the different treatment options for smoking cessation including tobacco replacement products such as Nicotine patches and gum, Wellbutrin. Total counseling time was 5 minutes.      Patient presents for annual exam  - General labs: ordered, awaiting results  - Discussed signing up for patient portal as means of communicating with me directly  - Discussed importance of communicating with me if has not heard back with results, etc  - Discussed age-appropriate vaccinations and screenings  - Pt verbalizes understanding, all questions/concerns addressed, in agreement w/plan    Follow up  Return in about 1 year  (around 11/27/2025) for annual physical.      Patient Instructions  Patient Instructions   It was nice to meet you!  I will reach out via Medina Hill MD          [1]   Current Outpatient Medications on File Prior to Visit   Medication Sig Dispense Refill    SERTRALINE 50 MG Oral Tab TAKE 1 AND ONE-HALF TABLETS(75 MG) BY MOUTH DAILY 135 tablet 0    ALPRAZolam 0.25 MG Oral Tab Take 1 tablet (0.25 mg total) by mouth daily as needed for Anxiety. 20 tablet 0     No current facility-administered medications on file prior to visit.

## 2024-11-27 ENCOUNTER — OFFICE VISIT (OUTPATIENT)
Dept: FAMILY MEDICINE CLINIC | Facility: CLINIC | Age: 44
End: 2024-11-27
Payer: COMMERCIAL

## 2024-11-27 VITALS
WEIGHT: 186.38 LBS | HEART RATE: 98 BPM | SYSTOLIC BLOOD PRESSURE: 116 MMHG | TEMPERATURE: 97 F | OXYGEN SATURATION: 99 % | DIASTOLIC BLOOD PRESSURE: 78 MMHG | RESPIRATION RATE: 16 BRPM | HEIGHT: 68 IN | BODY MASS INDEX: 28.25 KG/M2

## 2024-11-27 DIAGNOSIS — Z71.6 TOBACCO ABUSE COUNSELING: ICD-10-CM

## 2024-11-27 DIAGNOSIS — Z12.31 BREAST CANCER SCREENING BY MAMMOGRAM: ICD-10-CM

## 2024-11-27 DIAGNOSIS — F41.9 ANXIETY: ICD-10-CM

## 2024-11-27 DIAGNOSIS — Z12.4 CERVICAL CANCER SCREENING: ICD-10-CM

## 2024-11-27 DIAGNOSIS — Z00.00 ANNUAL PHYSICAL EXAM: Primary | ICD-10-CM

## 2024-11-27 PROBLEM — R93.3 ABNORMAL CT SCAN, GASTROINTESTINAL TRACT: Status: RESOLVED | Noted: 2020-07-22 | Resolved: 2024-11-27

## 2024-11-27 PROBLEM — K92.1 BLOOD IN STOOL: Status: RESOLVED | Noted: 2020-07-22 | Resolved: 2024-11-27

## 2024-11-27 PROBLEM — K59.1 FUNCTIONAL DIARRHEA: Status: RESOLVED | Noted: 2020-07-22 | Resolved: 2024-11-27

## 2024-11-27 PROCEDURE — 99406 BEHAV CHNG SMOKING 3-10 MIN: CPT

## 2024-11-27 PROCEDURE — 99396 PREV VISIT EST AGE 40-64: CPT

## 2024-12-03 DIAGNOSIS — F41.0 PANIC ATTACK: ICD-10-CM

## 2024-12-04 RX ORDER — ALPRAZOLAM 0.25 MG/1
0.25 TABLET ORAL DAILY PRN
Qty: 20 TABLET | Refills: 0 | Status: SHIPPED | OUTPATIENT
Start: 2024-12-04

## 2024-12-04 NOTE — TELEPHONE ENCOUNTER
Alprazolam last refilled 8/3/24  No future appointment in office  LOV with  11/27/24      Controlled Substance Medication [id number]/03/2024 11:42 PM    This medication is a controlled substance - forward to provider to refill

## 2024-12-12 NOTE — LETTER
ED [facility], [facility], [address] [facility] [place] [address]              4/3/2019        [place]  [address] [place] [address]        Dear Patient,     According to our records, you are due Patient called to reschedule her colonoscopy that is on 12/23; please call patient to reschedule.

## 2024-12-27 ENCOUNTER — TELEPHONE (OUTPATIENT)
Dept: FAMILY MEDICINE CLINIC | Facility: CLINIC | Age: 44
End: 2024-12-27

## 2025-01-09 ENCOUNTER — TELEPHONE (OUTPATIENT)
Dept: FAMILY MEDICINE CLINIC | Facility: CLINIC | Age: 45
End: 2025-01-09

## 2025-01-09 ENCOUNTER — HOSPITAL ENCOUNTER (OUTPATIENT)
Dept: MAMMOGRAPHY | Age: 45
Discharge: HOME | End: 2025-01-09
Payer: COMMERCIAL

## 2025-01-09 DIAGNOSIS — Z12.31 BREAST CANCER SCREENING BY MAMMOGRAM: ICD-10-CM

## 2025-01-09 PROCEDURE — 77067 SCR MAMMO BI INCL CAD: CPT

## 2025-01-09 PROCEDURE — 77063 BREAST TOMOSYNTHESIS BI: CPT

## 2025-01-09 NOTE — TELEPHONE ENCOUNTER
Jacqui Hill MD Allen, Laurie, RN; Radha Sheth, RN  Please notify patient to follow up for additional imaging      CONCLUSION:    Indeterminate right breast finding(s) for which further evaluation is recommended.     No mammographic evidence malignancy in the left breast.     BI-RADS CATEGORY:    DIAGNOSTIC CATEGORY 0 - INCOMPLETE ASSESSMENT: NEED ADDITIONAL IMAGING EVALUATION.       RECOMMENDATIONS:    ADDITIONAL MAMMOGRAPHIC VIEWS REQUIRED--We will call the patient back for additional views and issue an addendum report. RIGHT BREAST       Because of breast density, this patient may benefit from supplemental screening with breast MRI, Molecular Breast Imaging (MBI) or bilateral whole breast ultrasound for increased sensitivity for detection of cancer which can be obscured mammographically.    Please contact your ordering provider to discuss supplemental screening options.           A letter explaining the results in lay terms has been sent to the patient.  This exam was evaluated with a computer-aided device.  This patient's information has been entered into a reminder system with a target due date for the next mammogram.

## 2025-01-11 ENCOUNTER — PATIENT MESSAGE (OUTPATIENT)
Dept: FAMILY MEDICINE CLINIC | Facility: CLINIC | Age: 45
End: 2025-01-11

## 2025-01-11 ENCOUNTER — TELEPHONE (OUTPATIENT)
Dept: FAMILY MEDICINE CLINIC | Facility: CLINIC | Age: 45
End: 2025-01-11

## 2025-01-11 DIAGNOSIS — Z12.83 SKIN EXAM FOR MALIGNANT NEOPLASM: ICD-10-CM

## 2025-01-11 DIAGNOSIS — Z12.4 CERVICAL CANCER SCREENING: Primary | ICD-10-CM

## 2025-01-11 NOTE — TELEPHONE ENCOUNTER
1/11/2025  9:56 AM Y Radha Sheth RN Patient Medical Advice Request  Additional testing     Patient viewed [employer] message    No future appointments.

## 2025-01-13 ENCOUNTER — LAB ENCOUNTER (OUTPATIENT)
Dept: LAB | Age: 45
End: 2025-01-13
Payer: COMMERCIAL

## 2025-01-13 DIAGNOSIS — Z00.00 ANNUAL PHYSICAL EXAM: ICD-10-CM

## 2025-01-13 LAB
ALBUMIN SERPL-MCNC: 4.5 G/DL (ref 3.2–4.8)
ALBUMIN/GLOB SERPL: 1.7 {RATIO} (ref 1–2)
ALP LIVER SERPL-CCNC: 43 U/L
ALT SERPL-CCNC: 16 U/L
ANION GAP SERPL CALC-SCNC: 6 MMOL/L (ref 0–18)
AST SERPL-CCNC: 15 U/L (ref ?–34)
BASOPHILS # BLD AUTO: 0.05 X10(3) UL (ref 0–0.2)
BASOPHILS NFR BLD AUTO: 0.8 %
BILIRUB SERPL-MCNC: 0.4 MG/DL (ref 0.3–1.2)
BUN BLD-MCNC: 12 MG/DL (ref 9–23)
CALCIUM BLD-MCNC: 10.8 MG/DL (ref 8.7–10.4)
CHLORIDE SERPL-SCNC: 108 MMOL/L (ref 98–112)
CHOLEST SERPL-MCNC: 234 MG/DL (ref ?–200)
CO2 SERPL-SCNC: 28 MMOL/L (ref 21–32)
CREAT BLD-MCNC: 0.88 MG/DL
EGFRCR SERPLBLD CKD-EPI 2021: 83 ML/MIN/1.73M2 (ref 60–?)
EOSINOPHIL # BLD AUTO: 0.16 X10(3) UL (ref 0–0.7)
EOSINOPHIL NFR BLD AUTO: 2.6 %
ERYTHROCYTE [DISTWIDTH] IN BLOOD BY AUTOMATED COUNT: 12.2 %
EST. AVERAGE GLUCOSE BLD GHB EST-MCNC: 105 MG/DL (ref 68–126)
FASTING PATIENT LIPID ANSWER: YES
FASTING STATUS PATIENT QL REPORTED: YES
GLOBULIN PLAS-MCNC: 2.7 G/DL (ref 2–3.5)
GLUCOSE BLD-MCNC: 92 MG/DL (ref 70–99)
HBA1C MFR BLD: 5.3 % (ref ?–5.7)
HCT VFR BLD AUTO: 40.8 %
HDLC SERPL-MCNC: 69 MG/DL (ref 40–59)
HGB BLD-MCNC: 13.6 G/DL
IMM GRANULOCYTES # BLD AUTO: 0.02 X10(3) UL (ref 0–1)
IMM GRANULOCYTES NFR BLD: 0.3 %
LDLC SERPL CALC-MCNC: 149 MG/DL (ref ?–100)
LYMPHOCYTES # BLD AUTO: 2.07 X10(3) UL (ref 1–4)
LYMPHOCYTES NFR BLD AUTO: 33.9 %
MCH RBC QN AUTO: 32.2 PG (ref 26–34)
MCHC RBC AUTO-ENTMCNC: 33.3 G/DL (ref 31–37)
MCV RBC AUTO: 96.7 FL
MONOCYTES # BLD AUTO: 0.47 X10(3) UL (ref 0.1–1)
MONOCYTES NFR BLD AUTO: 7.7 %
NEUTROPHILS # BLD AUTO: 3.33 X10 (3) UL (ref 1.5–7.7)
NEUTROPHILS # BLD AUTO: 3.33 X10(3) UL (ref 1.5–7.7)
NEUTROPHILS NFR BLD AUTO: 54.7 %
NONHDLC SERPL-MCNC: 165 MG/DL (ref ?–130)
OSMOLALITY SERPL CALC.SUM OF ELEC: 293 MOSM/KG (ref 275–295)
PLATELET # BLD AUTO: 288 10(3)UL (ref 150–450)
POTASSIUM SERPL-SCNC: 4.3 MMOL/L (ref 3.5–5.1)
PROT SERPL-MCNC: 7.2 G/DL (ref 5.7–8.2)
RBC # BLD AUTO: 4.22 X10(6)UL
SODIUM SERPL-SCNC: 142 MMOL/L (ref 136–145)
TRIGL SERPL-MCNC: 92 MG/DL (ref 30–149)
TSI SER-ACNC: 2.11 UIU/ML (ref 0.55–4.78)
VLDLC SERPL CALC-MCNC: 17 MG/DL (ref 0–30)
WBC # BLD AUTO: 6.1 X10(3) UL (ref 4–11)

## 2025-01-13 PROCEDURE — 84443 ASSAY THYROID STIM HORMONE: CPT

## 2025-01-13 PROCEDURE — 80061 LIPID PANEL: CPT

## 2025-01-13 PROCEDURE — 85025 COMPLETE CBC W/AUTO DIFF WBC: CPT

## 2025-01-13 PROCEDURE — 36415 COLL VENOUS BLD VENIPUNCTURE: CPT

## 2025-01-13 PROCEDURE — 80053 COMPREHEN METABOLIC PANEL: CPT

## 2025-01-13 PROCEDURE — 83036 HEMOGLOBIN GLYCOSYLATED A1C: CPT

## 2025-01-15 ENCOUNTER — PATIENT MESSAGE (OUTPATIENT)
Dept: FAMILY MEDICINE CLINIC | Facility: CLINIC | Age: 45
End: 2025-01-15

## 2025-01-15 DIAGNOSIS — E83.52 SERUM CALCIUM ELEVATED: Primary | ICD-10-CM

## 2025-01-15 DIAGNOSIS — E78.00 ELEVATED LDL CHOLESTEROL LEVEL: ICD-10-CM

## 2025-01-15 NOTE — PROGRESS NOTES
Patient presents with:  Diarrhea: recent abx, treatment       HPI:    Patient ID: Edgardo Wiseman is a 45year old female. Loose stools x 1 day  Has 2 BM yesterday. Stool watery. No blood or mucus. Has BM today - more solid. No abd cramping.  Slight naus Alcohol use: No      Alcohol/week: 0.0 standard drinks    Drug use: No       PHYSICAL EXAM:    /62 (BP Location: Left arm, Patient Position: Sitting, Cuff Size: adult)   Pulse 92   Temp 97.9 °F (36.6 °C) (Temporal)   Resp 16   Ht 67\"   Wt 182 lb   L Normal for race

## 2025-01-21 ENCOUNTER — OFFICE VISIT (OUTPATIENT)
Dept: OBGYN CLINIC | Facility: CLINIC | Age: 45
End: 2025-01-21
Payer: COMMERCIAL

## 2025-01-21 VITALS
WEIGHT: 188.13 LBS | HEIGHT: 68 IN | BODY MASS INDEX: 28.51 KG/M2 | SYSTOLIC BLOOD PRESSURE: 110 MMHG | DIASTOLIC BLOOD PRESSURE: 66 MMHG | HEART RATE: 90 BPM

## 2025-01-21 DIAGNOSIS — N95.1 PERIMENOPAUSE: ICD-10-CM

## 2025-01-21 DIAGNOSIS — R68.82 DECREASED LIBIDO: ICD-10-CM

## 2025-01-21 DIAGNOSIS — Z87.410 HISTORY OF CERVICAL DYSPLASIA: ICD-10-CM

## 2025-01-21 DIAGNOSIS — N83.201 RIGHT OVARIAN CYST: ICD-10-CM

## 2025-01-21 DIAGNOSIS — Z01.419 WELL WOMAN EXAM WITH ROUTINE GYNECOLOGICAL EXAM: Primary | ICD-10-CM

## 2025-01-21 PROCEDURE — 3008F BODY MASS INDEX DOCD: CPT | Performed by: NURSE PRACTITIONER

## 2025-01-21 PROCEDURE — 3074F SYST BP LT 130 MM HG: CPT | Performed by: NURSE PRACTITIONER

## 2025-01-21 PROCEDURE — 88175 CYTOPATH C/V AUTO FLUID REDO: CPT | Performed by: NURSE PRACTITIONER

## 2025-01-21 PROCEDURE — 99396 PREV VISIT EST AGE 40-64: CPT | Performed by: NURSE PRACTITIONER

## 2025-01-21 PROCEDURE — 99213 OFFICE O/P EST LOW 20 MIN: CPT | Performed by: NURSE PRACTITIONER

## 2025-01-21 PROCEDURE — 87624 HPV HI-RISK TYP POOLED RSLT: CPT | Performed by: NURSE PRACTITIONER

## 2025-01-21 PROCEDURE — 3078F DIAST BP <80 MM HG: CPT | Performed by: NURSE PRACTITIONER

## 2025-01-21 PROCEDURE — 99459 PELVIC EXAMINATION: CPT | Performed by: NURSE PRACTITIONER

## 2025-01-21 NOTE — PROGRESS NOTES
Here for new gynecology visit.  44 year old G 2 P 2.  Patient's last menstrual period was 08/23/2021..     Here for Annual Gynecologic Exam.     She has some hormonal concerns. She had a Hysterectomy in 9/2021. She has her right ovary remaining. She had her hormones tested and they told her she was perimenopausal. She then saw Gynecology 8/2022 and mentioned low libido. She was told her Testosterone levels were low and treated with topical Testosterone. Her repeat levels were lower and she didn't feel any improvement so she discontinued them    She still feels her libido is decreased. She denies any hot flashes or night sweats. She had a right ovarian cystectomy at the time of her Hysterectomy and right salpingectomy. Her right ovary remains, her left ovary was removed in her 20's due to a large dermoid cyst. A CT was done in 2023 showing the right sided cyst was around 3.2 x 2.3 cm. She denies any pain at this time.    Last pap smear was in 2022 and it was normal.  She has had a hx of abnormal pap smears. In 2005 she had Cryotherapy to cervix for moderate cervical dysplasia. Her pap smears have been normal since then.     OB Hx:  2 C/S.    Family gyn hx:  neg.   Family breast hx:  both grandmothers.  Last mammogram in 2024- she is going back for additional views.  Screening labs with PCP are up to date.  Colonoscopy is up to date.    Past Medical History:    Abdominal hernia    Abdominal pain    Abnormal CT scan, gastrointestinal tract    Anemia    Anxiety    Blood in stool    Blood in the stool    Body piercing    Calculus of kidney    Colitis    Constipation    Decorative tattoo    Diarrhea, unspecified    Dysmenorrhea    Endometriosis    Endometriosis    Functional diarrhea    Heartburn    Heavy menses    History of blood transfusion     After emergency surgery.    Hypothyroid    Indigestion    Infertility, female    ivf    Insomnia    Irregular bowel habits    Menses painful    Mild dysplasia of cervix    Pain  with bowel movements    Polycystic ovarian disease    Polycystic ovarian disease    Skin cancer, basal cell    Skin cancer, basal cell     Past Surgical History:   Procedure Laterality Date    Appendectomy            Colonoscopy  ^    Hernia surgery      Hysterectomy      2020    Oophorectomy Left     Other surgical history      left ovary removed for dermoid cyst    Other surgical history      b/l inguinal hernia    Other surgical history N/A 2016    Procedure: ;  Surgeon: Jesse Monteiro DO;  Location:  [facility]     Medications Ordered Prior to Encounter[1]    OB History    Para Term  AB Living   2 2 2 0 0 2   SAB IAB Ectopic Multiple Live Births   0 0 0   2      # Outcome Date GA Lbr Filemon/2nd Weight Sex Type Anes PTL Lv   2 Term 16 39w0d  7 lb 6.9 oz (3.37 kg) F CS-LTranv Spinal N CHANDLER   1 Term 2013 40w0d  9 lb 13 oz (4.451 kg) M Caesarean   CHANDLER     ROS:    General:  No wt loss, wt gain, appetite changes.  Breasts:  No pain, lumps or secretions.  :   No urgency, frequency, CATRINA, bladder problems in past.    /66   Pulse 90   Ht 68\"   Wt 188 lb 2 oz (85.3 kg)   LMP 2021   BMI 28.60 kg/m²     NECK:  Thyroid normal size without nodules. No adenopathy.  LUNGS:  Clear to auscultation.  COR;  Regular rate and rhythm.    BREASTS:  symmetrical in shape. No masses, tenderness, secretions, or adenopathy.  ABDOMEN:  Soft and non tender.  No organomegaly.  No inguinal adenopathy.  VULVA:  No lesions or erythema.  VAGINA:  No lesions, small discharge.  CERVIX:  Cuff intact.  Pap smear done with HPV.  UTERUS:  surgically absent.  ADNEXA:  No pain or masses.    IMP/PLAN:    1. Well woman exam with routine gynecological exam  Regular self breast exams recommended    2. Right ovarian cyst  If no pain or symptoms we can observe likely  - US PELVIS W EV (CPT=76856/56735); Future    3. Decreased libido  If not menopausal she can consider medication for Hypoactive sexual  dysfunction disorder.  Also discussed herbs for management  - FSH; Future  - Estradiol; Future    4. History of cervical dysplasia  - ThinPrep PAP with HPV Reflex Request; Future  - ThinPrep PAP with HPV Reflex Request  If pap is normal she has gone 10 years from abnormal dysplasia she can discontinue pap smear    5. Perimenopause  - FSH; Future  - Estradiol; Future    An additional 30 minutes was spent looking through records and discussing patient concerns and history outside routine annual exam    See in 1 year/ prn.         [1]   Current Outpatient Medications on File Prior to Visit   Medication Sig Dispense Refill    ALPRAZolam 0.25 MG Oral Tab Take 1 tablet (0.25 mg total) by mouth daily as needed for Anxiety. 20 tablet 0    SERTRALINE 50 MG Oral Tab TAKE 1 AND ONE-HALF TABLETS(75 MG) BY MOUTH DAILY 135 tablet 0     No current facility-administered medications on file prior to visit.

## 2025-01-22 ENCOUNTER — LABORATORY ENCOUNTER (OUTPATIENT)
Dept: LAB | Age: 45
End: 2025-01-22
Attending: NURSE PRACTITIONER
Payer: COMMERCIAL

## 2025-01-22 DIAGNOSIS — N95.1 PERIMENOPAUSE: ICD-10-CM

## 2025-01-22 DIAGNOSIS — R68.82 DECREASED LIBIDO: ICD-10-CM

## 2025-01-22 LAB
ESTRADIOL SERPL-MCNC: 44.7 PG/ML
FSH SERPL-ACNC: 27.1 MIU/ML

## 2025-01-22 PROCEDURE — 36415 COLL VENOUS BLD VENIPUNCTURE: CPT

## 2025-01-22 PROCEDURE — 82670 ASSAY OF TOTAL ESTRADIOL: CPT

## 2025-01-22 PROCEDURE — 83001 ASSAY OF GONADOTROPIN (FSH): CPT

## 2025-01-24 LAB
HPV E6+E7 MRNA CVX QL NAA+PROBE: NEGATIVE
LAST PAP RESULT: NORMAL

## 2025-01-29 ENCOUNTER — HOSPITAL ENCOUNTER (OUTPATIENT)
Dept: MAMMOGRAPHY | Facility: HOSPITAL | Age: 45
Discharge: HOME | End: 2025-01-29
Payer: COMMERCIAL

## 2025-01-29 DIAGNOSIS — R92.2 INCONCLUSIVE MAMMOGRAM: ICD-10-CM

## 2025-01-29 PROCEDURE — 77065 DX MAMMO INCL CAD UNI: CPT

## 2025-01-29 PROCEDURE — 77061 BREAST TOMOSYNTHESIS UNI: CPT

## 2025-01-30 ENCOUNTER — TELEPHONE (OUTPATIENT)
Dept: FAMILY MEDICINE CLINIC | Facility: CLINIC | Age: 45
End: 2025-01-30

## 2025-01-30 NOTE — TELEPHONE ENCOUNTER
----- Message from Jacqui Hill sent at 1/29/2025  5:21 PM CST -----  Benign calcifications found in right breast. Plan to repeat right side mammogram in 6 months

## 2025-02-08 ENCOUNTER — PATIENT MESSAGE (OUTPATIENT)
Dept: FAMILY MEDICINE CLINIC | Facility: CLINIC | Age: 45
End: 2025-02-08

## 2025-02-14 DIAGNOSIS — F41.0 PANIC ATTACK: ICD-10-CM

## 2025-02-15 RX ORDER — ALPRAZOLAM 0.25 MG
0.25 TABLET ORAL DAILY PRN
Qty: 20 TABLET | Refills: 0 | Status: SHIPPED | OUTPATIENT
Start: 2025-02-15

## 2025-02-15 NOTE — TELEPHONE ENCOUNTER
Last office visit 11/27/24  Last refilled on 11/14/24 for # 90 with 0 refills  No future appointments.     Thank you.

## 2025-02-15 NOTE — TELEPHONE ENCOUNTER
Last office visit 11/27/24  Last refilled on 12/4/24 for # 20 with 0 refills  No future appointments.     Thank you.

## 2025-02-17 ENCOUNTER — TELEPHONE (OUTPATIENT)
Dept: FAMILY MEDICINE CLINIC | Facility: CLINIC | Age: 45
End: 2025-02-17

## 2025-02-20 ENCOUNTER — TELEPHONE (OUTPATIENT)
Dept: FAMILY MEDICINE CLINIC | Facility: CLINIC | Age: 45
End: 2025-02-20

## 2025-02-20 NOTE — TELEPHONE ENCOUNTER
Patient scheduled an appointment with dr hill on 2/24 for  \"Face numbness due to past dental procedures\"    Future Appointments   Date Time Provider Department Center   2/24/2025  9:20 AM Jacqui Hill MD [facility] [place]     Ok to keep?

## 2025-02-23 NOTE — PROGRESS NOTES
Chief Complaint   Patient presents with    Dental Problem     Left [place] tooth removed, face area numb          HPI  Radha Santos is a 44 year old F pt who presents today for persistent left facial numbness s/p wisdom teeth extracted in 2017      The wisdom tooth was impacted, and no imaging was performed prior to the extraction. The patient describes the sensation as constant numbness, similar to the feeling after a dental procedure, without associated pain. She denies facial drooping but reports some slurred speech.    The patient has consulted multiple dentists regarding this issue, including switching to a new dentist who is aware of the numbness. A previous dentist suggested taking a vitamin supplement, though the patient does not recall which specific vitamin. The patient had another tooth extracted later by an oral surgeon without complications.    ROS  As per HPI    Past Medical History:    Abdominal hernia    Abdominal pain    Abnormal CT scan, gastrointestinal tract    Anemia    Anxiety    Blood in stool    Blood in the stool    Body piercing    Calculus of kidney    Colitis    Constipation    Decorative tattoo    Diarrhea, unspecified    Dysmenorrhea    Endometriosis    Endometriosis    Functional diarrhea    Heartburn    Heavy menses    History of blood transfusion     After emergency surgery.    Hypothyroid    Indigestion    Infertility, female    ivf    Insomnia    Irregular bowel habits    Menses painful    Mild dysplasia of cervix    Pain with bowel movements    Polycystic ovarian disease    Polycystic ovarian disease    Skin cancer, basal cell    Skin cancer, basal cell       Past Surgical History:   Procedure Laterality Date    Appendectomy            Colonoscopy  ^    Hernia surgery      Hysterectomy      2020    Oophorectomy Left     Other surgical history      left ovary removed for dermoid cyst    Other surgical history      b/l inguinal hernia    Other surgical history N/A  2016    Procedure: ;  Surgeon: Jesse Monteiro DO;  Location:  [facility]       Social History     Socioeconomic History    Marital status:    Tobacco Use    Smoking status: Some Days     Current packs/day: 0.00     Types: Cigarettes     Passive exposure: Never    Smokeless tobacco: Never    Tobacco comments:     OCC   Vaping Use    Vaping status: Never Used   Substance and Sexual Activity    Alcohol use: Yes     Alcohol/week: 0.0 standard drinks of alcohol     Comment: OCC    Drug use: No    Sexual activity: Yes     Partners: Male     Birth control/protection: Hysterectomy   Other Topics Concern    Caffeine Concern Yes     Comment: 1 cup daily     Exercise No    Seat Belt Yes       Family History   Problem Relation Age of Onset    Cancer Father         skin cancer    Heart Disorder Father 62    Hypertension Father     Colon Polyps Father     Heart Attack Father     Psychiatric Sister     Psychiatric Brother     Breast Cancer Maternal Grandmother 80    Cancer Paternal Grandmother     Breast Cancer Paternal Grandmother 80    Diabetes Neg         Medications Ordered Prior to Encounter[1]      Objective  Vitals:    25 0915   BP: 104/74   Pulse: 87   Resp: 16   Temp: 97.3 °F (36.3 °C)   SpO2: 98%   Weight: 188 lb 12.8 oz (85.6 kg)   Height: 5' 8\" (1.727 m)     Body mass index is 28.71 kg/m².    Physical Exam  Constitutional:       Appearance: Normal appearance.   HEENT:      Head: Normocephalic and atraumatic.      Eyes: PERRLA no notable nystagmus     Ears: normal on observation     Nose: Nose normal.      Mouth: Mucous membranes are moist. No gum erythema noted      Neck: no masses no bruit  Cardiovascular:      Rate and Rhythm: Normal rate and regular rhythm.   Pulmonary:      Effort: Pulmonary effort is normal.      Breath sounds: Normal breath sounds.   Musculoskeletal:         General: Normal range of motion.    Skin:     General: Skin is warm and dry.   Neurological:      General:+L  lower lip and jaw with decreased sensation      Mental Status: Alert and oriented to person, place, and time.   Psychiatric:         Mood and Affect: Mood normal.         Thought Content: Thought content normal.       Assessment and Plan  Radha was seen today for dental problem.    Diagnoses and all orders for this visit:    Left facial numbness  Comments:  s/p wisdom tooth extration in 2017  Follow up with dentist for further recommendations  Start Gabapentin  Orders:  -     gabapentin 100 MG Oral Cap; Take 1 capsule (100 mg total) by mouth nightly.       Start gabapentin nightly as prescribed   Follow up with dentist for further recommendations   Pt verbalizes understanding, all questions/concerns addressed, in agreement w/plan      Follow up  Return if symptoms worsen or fail to improve.      Patient Instructions  Patient Instructions   Start gabapentin nightly as prescribed   Follow up with dentist for further recommendations       Jacqui Hill MD          [1]   Current Outpatient Medications on File Prior to Visit   Medication Sig Dispense Refill    ALPRAZOLAM 0.25 MG Oral Tab TAKE 1 TABLET(0.25 MG) BY MOUTH DAILY AS NEEDED FOR ANXIETY 20 tablet 0    SERTRALINE 50 MG Oral Tab TAKE 1 AND ONE-HALF TABLETS(75 MG) BY MOUTH DAILY 135 tablet 0     No current facility-administered medications on file prior to visit.

## 2025-02-24 ENCOUNTER — OFFICE VISIT (OUTPATIENT)
Dept: FAMILY MEDICINE CLINIC | Facility: CLINIC | Age: 45
End: 2025-02-24
Payer: COMMERCIAL

## 2025-02-24 VITALS
BODY MASS INDEX: 28.61 KG/M2 | TEMPERATURE: 97 F | HEART RATE: 87 BPM | WEIGHT: 188.81 LBS | RESPIRATION RATE: 16 BRPM | OXYGEN SATURATION: 98 % | SYSTOLIC BLOOD PRESSURE: 104 MMHG | HEIGHT: 68 IN | DIASTOLIC BLOOD PRESSURE: 74 MMHG

## 2025-02-24 DIAGNOSIS — R20.0 LEFT FACIAL NUMBNESS: Primary | ICD-10-CM

## 2025-02-24 PROCEDURE — 99213 OFFICE O/P EST LOW 20 MIN: CPT

## 2025-02-24 PROCEDURE — 3074F SYST BP LT 130 MM HG: CPT

## 2025-02-24 PROCEDURE — 3078F DIAST BP <80 MM HG: CPT

## 2025-02-24 PROCEDURE — 3008F BODY MASS INDEX DOCD: CPT

## 2025-02-24 RX ORDER — GABAPENTIN 100 MG/1
100 CAPSULE ORAL NIGHTLY
Qty: 30 CAPSULE | Refills: 0 | Status: SHIPPED | OUTPATIENT
Start: 2025-02-24

## 2025-04-10 ENCOUNTER — HOSPITAL ENCOUNTER (OUTPATIENT)
Dept: ULTRASOUND IMAGING | Age: 45
Discharge: HOME | End: 2025-04-10
Attending: NURSE PRACTITIONER
Payer: COMMERCIAL

## 2025-04-10 DIAGNOSIS — N83.201 RIGHT OVARIAN CYST: ICD-10-CM

## 2025-04-10 PROCEDURE — 76830 TRANSVAGINAL US NON-OB: CPT | Performed by: NURSE PRACTITIONER

## 2025-04-10 PROCEDURE — 76856 US EXAM PELVIC COMPLETE: CPT | Performed by: NURSE PRACTITIONER

## 2025-04-14 NOTE — PROGRESS NOTES
Voicemail left for patient to return call  Shoutlet message sent.  -instructed to schedule consult with MD to review results

## 2025-04-20 DIAGNOSIS — F41.0 PANIC ATTACK: ICD-10-CM

## 2025-04-21 NOTE — TELEPHONE ENCOUNTER
Last office visit on 2/24/2025 for left facial numbness with Dr. Hill    Last refill - 2/15/2025 quantity 20

## 2025-04-22 RX ORDER — ALPRAZOLAM 0.25 MG
0.25 TABLET ORAL DAILY PRN
Qty: 20 TABLET | Refills: 0 | Status: SHIPPED | OUTPATIENT
Start: 2025-04-22

## 2025-05-19 ENCOUNTER — TELEPHONE (OUTPATIENT)
Dept: FAMILY MEDICINE CLINIC | Facility: CLINIC | Age: 45
End: 2025-05-19

## 2025-05-22 NOTE — TELEPHONE ENCOUNTER
Last office visit 2/24/25  Last refilled on 2/15/25 for # 135 with 0 refills  No future appointments.     Thank you.

## 2025-06-13 ENCOUNTER — PATIENT MESSAGE (OUTPATIENT)
Dept: FAMILY MEDICINE CLINIC | Facility: CLINIC | Age: 45
End: 2025-06-13

## 2025-06-13 DIAGNOSIS — R92.1 BREAST CALCIFICATION SEEN ON MAMMOGRAM: Primary | ICD-10-CM

## 2025-06-13 NOTE — TELEPHONE ENCOUNTER
Last mammogram done 1/29/25   CONCLUSION:  Magnified imaging of the probably benign calcification in the right retroareolar region in 6 months is recommended for further assessment.     BI-RADS CATEGORY:    DIAGNOSTIC CATEGORY 3 - PROBABLY BENIGN FINDING.       RECOMMENDATIONS:    SHORT TERM FOLLOW-UP DIAGNOSTIC MAMMOGRAM RIGHT BREAST IN 6 MONTHS.    Order placed

## 2025-08-04 DIAGNOSIS — F41.0 PANIC ATTACK: ICD-10-CM

## 2025-08-05 RX ORDER — ALPRAZOLAM 0.25 MG
0.25 TABLET ORAL DAILY PRN
Qty: 20 TABLET | Refills: 0 | Status: SHIPPED | OUTPATIENT
Start: 2025-08-05

## 2025-08-20 ENCOUNTER — HOSPITAL ENCOUNTER (OUTPATIENT)
Dept: MAMMOGRAPHY | Facility: HOSPITAL | Age: 45
Discharge: HOME | End: 2025-08-20

## 2025-08-20 DIAGNOSIS — R92.1 BREAST CALCIFICATION SEEN ON MAMMOGRAM: ICD-10-CM

## 2025-08-20 PROCEDURE — 77065 DX MAMMO INCL CAD UNI: CPT

## 2025-08-20 PROCEDURE — 77061 BREAST TOMOSYNTHESIS UNI: CPT

## (undated) DEVICE — Device

## (undated) DEVICE — PROXIS URETERAL ACCESS SHEATH

## (undated) DEVICE — NITINOL STONE RETRIEVAL BASKET: Brand: ZERO TIP

## (undated) DEVICE — SOL  .9 3000ML

## (undated) DEVICE — STERILE POLYISOPRENE POWDER-FREE SURGICAL GLOVES: Brand: PROTEXIS

## (undated) DEVICE — KENDALL SCD EXPRESS SLEEVES, KNEE LENGTH, MEDIUM: Brand: KENDALL SCD

## (undated) DEVICE — FLEXIVA  PULSE  AND  FLEXIVA  PULSE  TRACTIP  LASER  FIBERS  ARE  HIGH  POWER  SINGLE-USE FIBER: Brand: FLEXIVA PULSE ID

## (undated) DEVICE — LAWSON - CUP VCARE CERV 37MM LG

## (undated) DEVICE — ZIPWIRE GUIDEWIRE .038X150 STR

## (undated) DEVICE — LAWSON - CANISTER SUCT W/LID 2000CC

## (undated) DEVICE — LAWSON - TROCAR V2 BLDLS OPTCL 5MM STD

## (undated) DEVICE — SEAL Y BIOPSY PORT P6R SCOPE

## (undated) DEVICE — LAWSON - GOWN SURG STD XL STL DISP

## (undated) DEVICE — LAWSON - WATER STL IRR PIC 1000ML

## (undated) DEVICE — TIGERTAIL 5F FLXTIP 70CM

## (undated) DEVICE — CYSTO CDS-LF: Brand: MEDLINE INDUSTRIES, INC.

## (undated) DEVICE — LAWSON - DEVICE PNEUVIEW XE SMK ELMIN

## (undated) NOTE — LETTER
Date & Time: 2/16/2019, 1:31 PM  Patient: Juaquin Mann  Encounter Provider(s):    Lenin Luna MD       To Whom It May Concern:    Drew Hilliard was seen and treated in our department on 2/16/2019.  She should not return to work until Tuesday, [place]

## (undated) NOTE — LETTER
Date & Time: 2/16/2019, 1:32 PM  Patient: Raven Day  Encounter Provider(s):    Jemal Clemons MD       To Whom It May Concern:    Chantelle Kerns was seen and treated in our department on 2/16/2019. She {Return to school/sport/work:[id number]}.

## (undated) NOTE — LETTER
12/04/20        Marin West  [address] [facility] [address]      Dear Michael Navarrete records indicate that you have outstanding lab work that was ordered for you and has not yet been completed:        CBC With Diff      CMP      Lipid      He

## (undated) NOTE — ED AVS SNAPSHOT
Erinn Bhagat   MRN: [id number]    Department:  [facility] Emergency Department   Date of Visit:  2/14/2019           Disclosure     Insurance plans vary and the physician(s) referred by the ER may not be covered by your plan.  Please contact you tell this physician (or your personal doctor if your instructions are to return to your personal doctor) about any new or lasting problems. The primary care or specialist physician will see patients referred from the [facility] Emergency Department.  Rod White

## (undated) NOTE — LETTER
12/04/20      Roz Becker  [address] [place] [address]          Dear Gustavo Hernandez,    According to our records, you are due for your annual mammography screening. Please contact our office to obtain a mammogram order.  If this letter has been sen

## (undated) NOTE — LETTER
08/28/20        Michelle Shen  [address] [facility] [address]      Dear Jaquelin Brady records indicate that you have outstanding lab work and or testing that was ordered for you and has not yet been completed:  Orders Placed This Encounter

## (undated) NOTE — LETTER
Date: 2/22/2019    Patient Name: Adamaris Hudson          To Whom it may concern: This letter has been written at the patient's request. The above patient was seen at the [facility] for treatment of a medical condition.     This patient evelin

## (undated) NOTE — MR AVS SNAPSHOT
[address] [place] [id number] [phone]               Thank you for choosing us for your health care visit with Mirna Sharp MD.  We are glad to serve you and happy to provide you with this summary of your v Enter your [facility] Activation Code exactly as it appears below along with your Zip Code and Date of Birth to complete the sign-up process. If you do not sign up before the expiration date, you must request a new code.     Your unique [facility] Access Code: [email or web address] Visit [facility] online at  [email or web address]

## (undated) NOTE — LETTER
07/05/19        Omid Arana  [address] [place] [id number]      Dear Prem Alejandro records indicate that you have outstanding lab work  that was ordered for you and has not yet been completed:        CBC With Diff      CMP      Lipid      H

## (undated) NOTE — LETTER
Date: 2/20/2019    Patient Name: Michelle Shen          To Whom it may concern: The above patient was seen at the [facility] for treatment of a medical condition.     This patient should be excused from attending work from 2/20/19 through

## (undated) NOTE — LETTER
Date & Time: 2/15/2019, 12:17 AM  Patient: Dev Nguyen  Encounter Provider(s):    Nancy Brown MD       To Whom It May Concern:    Shanique Reyes was seen and treated in our department on 2/14/2019.  She may return to work 2/17/2019    If you have

## (undated) NOTE — LETTER
Date: 2/20/2019    Patient Name: Vanessa Bunch          To Whom it may concern: The above patient was seen at the [facility] for treatment of a concussion.     This patient should be excused from attending work  from 2/10/19 through 2/24

## (undated) NOTE — LETTER
[facility] [address], [address] [facility] [address] [facility]  [phone]              07/25/18    Doreen [address]      Our records indicate that you are due for medication follow up pr

## (undated) NOTE — LETTER
Latonya Brown [address] [place] [address] E  [facility], [address] [facility]    Consent for Operation  Date: _4/22/20                                Time: _______________    1. I authorize the performance upon [employer] the following operation:  Procedure(s):  2.  C procedure has been videotaped, the surgeon will obtain the original videotape. The hospital will not be responsible for storage or maintenance of this tape.   7. For the purpose of advancing medical education, I consent to the admittance of observers to the STATEMENTS REQUIRING INSERTION OR COMPLETION WERE FILLED IN.     Signature of Patient:   ___________________________    When the patient is a minor or mentally incompetent to give consent:  Signature of person authorized to consent for patient: ____________ supplements, and pills I can buy without a prescription (including street drugs/illegal medications). Failure to inform my anesthesiologist about these medicines may increase my risk of anesthetic complications. iv.  If I am allergic to anything or have ha Anesthesiologist Signature     Date   Time  I have discussed the procedure and information above with the patient (or patient’s representative) and answered their questions. The patient or their representative has agreed to have anesthesia services.     ___

## (undated) NOTE — LETTER
Date: 12/10/2021    Patient Name: Vanessa Bunch          To Whom it may concern: The above patient was seen at the [facility] for treatment of a medical condition.     The patient may return to work with 2 weeks with the following limita

## (undated) NOTE — LETTER
09/26/19        Ana Urena  [address] [facility]      Dear Salima Inman,    [facility] records indicate that you have outstanding lab work and or testing that was ordered for you and has not yet been completed:        CBC With Diff      CMP